# Patient Record
Sex: FEMALE | Race: WHITE | NOT HISPANIC OR LATINO | Employment: FULL TIME | URBAN - METROPOLITAN AREA
[De-identification: names, ages, dates, MRNs, and addresses within clinical notes are randomized per-mention and may not be internally consistent; named-entity substitution may affect disease eponyms.]

---

## 2018-04-10 ENCOUNTER — OFFICE VISIT (OUTPATIENT)
Dept: OBGYN CLINIC | Facility: CLINIC | Age: 28
End: 2018-04-10
Payer: OTHER GOVERNMENT

## 2018-04-10 VITALS
WEIGHT: 138.6 LBS | BODY MASS INDEX: 26.17 KG/M2 | SYSTOLIC BLOOD PRESSURE: 100 MMHG | HEIGHT: 61 IN | DIASTOLIC BLOOD PRESSURE: 71 MMHG

## 2018-04-10 DIAGNOSIS — Z01.419 ENCOUNTER FOR ANNUAL ROUTINE GYNECOLOGICAL EXAMINATION: Primary | ICD-10-CM

## 2018-04-10 DIAGNOSIS — Z30.011 ORAL CONTRACEPTION INITIAL PRESCRIPTION: ICD-10-CM

## 2018-04-10 PROCEDURE — 99385 PREV VISIT NEW AGE 18-39: CPT | Performed by: OBSTETRICS & GYNECOLOGY

## 2018-04-10 PROCEDURE — G0145 SCR C/V CYTO,THINLAYER,RESCR: HCPCS | Performed by: OBSTETRICS & GYNECOLOGY

## 2018-04-10 NOTE — PROGRESS NOTES
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  Her  is in the [facility] and will be moving up here in the next several months  She recently ran out of her birth control pills and since then her periods have been on the more irregular side and heavy  She also has a history of ovarian cysts and has been having some right-sided pelvic discomfort  She is interested in restarting the birth control pill today  She has had no issues with compliance previously  She denies pain with intercourse  She denies vaginal discharge  She has no issues with urination  She does have some issues with her bowels after her previous surgeries  She denies significant medical history  They plan on trying to conceive in approximately 3 years  The following portions of the patient's history were reviewed and updated as appropriate: allergies, current medications, past family history, past medical history, past social history, past surgical history and problem list     Review of Systems   Respiratory: Negative for chest tightness and shortness of breath  Gastrointestinal: Negative for abdominal pain  Genitourinary: Positive for menstrual problem and pelvic pain  Negative for dyspareunia, vaginal bleeding, vaginal discharge and vaginal pain  Objective:      /71   Ht 5' 1 2" (1 554 m)   Wt 62 9 kg (138 lb 9 6 oz)   LMP 04/10/2018   BMI 26 02 kg/m²          Physical Exam   Constitutional: She is oriented to person, place, and time     Pulmonary/Chest: Right breast exhibits no inverted nipple, no mass, no nipple discharge, no skin change and no tenderness  Left breast exhibits no inverted nipple, no mass, no nipple discharge, no skin change and no tenderness  Genitourinary: Vagina normal and uterus normal  There is no tenderness or injury on the right labia  There is no tenderness or injury on the left labia  Uterus is not enlarged and not tender  Cervix exhibits no motion tenderness  Right adnexum displays no mass and no tenderness  Left adnexum displays no mass and no tenderness  No vaginal discharge found  Musculoskeletal: Normal range of motion  Neurological: She is alert and oriented to person, place, and time  Skin: Skin is warm and dry  No erythema  Psychiatric: She has a normal mood and affect

## 2018-04-12 LAB
LAB AP GYN PRIMARY INTERPRETATION: NORMAL
Lab: NORMAL

## 2018-04-25 ENCOUNTER — TELEPHONE (OUTPATIENT)
Dept: OBGYN CLINIC | Facility: CLINIC | Age: 28
End: 2018-04-25

## 2018-04-25 DIAGNOSIS — Z30.41 ENCOUNTER FOR SURVEILLANCE OF CONTRACEPTIVE PILLS: Primary | ICD-10-CM

## 2018-04-25 RX ORDER — NORETHINDRONE ACETATE AND ETHINYL ESTRADIOL 1; .02 MG/1; MG/1
1 TABLET ORAL DAILY
Qty: 21 TABLET | Refills: 7 | Status: SHIPPED | OUTPATIENT
Start: 2018-04-25 | End: 2018-12-27 | Stop reason: SDUPTHER

## 2018-04-25 NOTE — TELEPHONE ENCOUNTER
Spoke with pt - started taking Lo/Ovral - is having a bad reaction to it - gaining weight and having an acne breakout  Patient was previously on Microgestin Fe 1/20 - wants to be put on that  Ok to fill? Please advise  Thanks!

## 2018-04-25 NOTE — TELEPHONE ENCOUNTER
Pt called office today, left voicemail message  Pt saw Dr Honorio Boone on 4/10/18 (new patient)  Pt states she was prescribed  LO/OVRAL 0 3mg - 30 mcg birth control pill at last visit  Pt states she is having a poor reaction to said birth control pill, wants prescription changed  Pt can be reached @ [phone]

## 2018-12-27 DIAGNOSIS — Z30.41 ENCOUNTER FOR SURVEILLANCE OF CONTRACEPTIVE PILLS: Primary | ICD-10-CM

## 2018-12-27 RX ORDER — NORETHINDRONE ACETATE AND ETHINYL ESTRADIOL 1; .02 MG/1; MG/1
1 TABLET ORAL DAILY
Qty: 21 TABLET | Refills: 4 | Status: SHIPPED | OUTPATIENT
Start: 2018-12-27 | End: 2019-04-16

## 2019-04-16 ENCOUNTER — ANNUAL EXAM (OUTPATIENT)
Dept: OBGYN CLINIC | Facility: CLINIC | Age: 29
End: 2019-04-16
Payer: COMMERCIAL

## 2019-04-16 VITALS — SYSTOLIC BLOOD PRESSURE: 112 MMHG | WEIGHT: 134.4 LBS | BODY MASS INDEX: 25.23 KG/M2 | DIASTOLIC BLOOD PRESSURE: 64 MMHG

## 2019-04-16 DIAGNOSIS — Z30.41 ENCOUNTER FOR BIRTH CONTROL PILLS MAINTENANCE: ICD-10-CM

## 2019-04-16 DIAGNOSIS — Z01.419 ENCOUNTER FOR GYNECOLOGICAL EXAMINATION: Primary | ICD-10-CM

## 2019-04-16 PROCEDURE — S0610 ANNUAL GYNECOLOGICAL EXAMINA: HCPCS | Performed by: NURSE PRACTITIONER

## 2019-04-16 RX ORDER — NORETHINDRONE ACETATE AND ETHINYL ESTRADIOL 1; .02 MG/1; MG/1
1 TABLET ORAL DAILY
Qty: 90 TABLET | Refills: 3 | Status: SHIPPED | OUTPATIENT
Start: 2019-04-16 | End: 2020-05-15 | Stop reason: SDUPTHER

## 2019-04-16 RX ORDER — MAGNESIUM 30 MG
30 TABLET ORAL 2 TIMES DAILY
COMMUNITY
End: 2020-08-03 | Stop reason: ALTCHOICE

## 2019-04-19 PROBLEM — Z01.419 ENCOUNTER FOR GYNECOLOGICAL EXAMINATION: Status: ACTIVE | Noted: 2019-04-19

## 2020-04-30 DIAGNOSIS — Z30.41 ENCOUNTER FOR BIRTH CONTROL PILLS MAINTENANCE: ICD-10-CM

## 2020-05-15 ENCOUNTER — TELEPHONE (OUTPATIENT)
Dept: OBGYN CLINIC | Facility: CLINIC | Age: 30
End: 2020-05-15

## 2020-05-15 DIAGNOSIS — Z30.41 ENCOUNTER FOR BIRTH CONTROL PILLS MAINTENANCE: ICD-10-CM

## 2020-05-15 RX ORDER — NORETHINDRONE ACETATE AND ETHINYL ESTRADIOL 1; 20 MG/1; UG/1
TABLET ORAL
Qty: 84 TABLET | Refills: 3 | OUTPATIENT
Start: 2020-05-15

## 2020-05-18 RX ORDER — NORETHINDRONE ACETATE AND ETHINYL ESTRADIOL 1; .02 MG/1; MG/1
1 TABLET ORAL DAILY
Qty: 90 TABLET | Refills: 0 | Status: SHIPPED | OUTPATIENT
Start: 2020-05-18 | End: 2020-08-03 | Stop reason: SDUPTHER

## 2020-08-03 ENCOUNTER — ANNUAL EXAM (OUTPATIENT)
Dept: OBGYN CLINIC | Facility: CLINIC | Age: 30
End: 2020-08-03
Payer: COMMERCIAL

## 2020-08-03 VITALS
SYSTOLIC BLOOD PRESSURE: 108 MMHG | BODY MASS INDEX: 24.8 KG/M2 | HEIGHT: 63 IN | DIASTOLIC BLOOD PRESSURE: 72 MMHG | WEIGHT: 140 LBS

## 2020-08-03 DIAGNOSIS — Z12.4 CERVICAL CANCER SCREENING: ICD-10-CM

## 2020-08-03 DIAGNOSIS — Z01.419 ENCOUNTER FOR GYNECOLOGICAL EXAMINATION (GENERAL) (ROUTINE) WITHOUT ABNORMAL FINDINGS: Primary | ICD-10-CM

## 2020-08-03 DIAGNOSIS — Z30.41 ENCOUNTER FOR BIRTH CONTROL PILLS MAINTENANCE: ICD-10-CM

## 2020-08-03 PROCEDURE — S0612 ANNUAL GYNECOLOGICAL EXAMINA: HCPCS | Performed by: OBSTETRICS & GYNECOLOGY

## 2020-08-03 PROCEDURE — G0145 SCR C/V CYTO,THINLAYER,RESCR: HCPCS | Performed by: OBSTETRICS & GYNECOLOGY

## 2020-08-03 RX ORDER — NORETHINDRONE ACETATE AND ETHINYL ESTRADIOL 1; .02 MG/1; MG/1
1 TABLET ORAL DAILY
Qty: 90 TABLET | Refills: 0 | Status: SHIPPED | OUTPATIENT
Start: 2020-08-03 | End: 2021-08-24 | Stop reason: ALTCHOICE

## 2020-08-03 NOTE — PROGRESS NOTES
Bertha Mendoza  1990    CC:  Yearly exam    S:  34 y o  female here for yearly exam  Her cycles are regular, not heavy or crampy  Sexual activity: She is sexually active without pain, bleeding or dryness  Contraception:  She uses OCP for contraception  However is considering stopping her OCP in next few months to conceive  STD testing:  She does not request STD testing today   and monogamous  Gardasil:  She has not had the Gardasil series  We reviewed ASCCP guidelines for Pap testing       Last Pap 4/10/18 - Normal Cytology    Family hx of breast cancer:  no  Family hx of ovarian cancer:  no  Family hx of colon cancer:  no      Current Outpatient Medications:     norethindrone-ethinyl estradiol (MICROGESTIN 1/20) 1-20 MG-MCG per tablet, Take 1 tablet by mouth daily, Disp: 90 tablet, Rfl: 0  Social History     Socioeconomic History    Marital status: Single     Spouse name: Not on file    Number of children: Not on file    Years of education: Not on file    Highest education level: Not on file   Occupational History    Not on file   Social Needs    Financial resource strain: Not on file    Food insecurity     Worry: Not on file     Inability: Not on file    Transportation needs     Medical: Not on file     Non-medical: Not on file   Tobacco Use    Smoking status: Never Smoker    Smokeless tobacco: Never Used   Substance and Sexual Activity    Alcohol use: Yes     Comment: Social, Drinks Hard Liquor    Drug use: No    Sexual activity: Yes     Partners: Male     Birth control/protection: Pill   Lifestyle    Physical activity     Days per week: Not on file     Minutes per session: Not on file    Stress: Not on file   Relationships    Social connections     Talks on phone: Not on file     Gets together: Not on file     Attends Zoroastrian service: Not on file     Active member of club or organization: Not on file     Attends meetings of clubs or organizations: Not on file     Relationship status: Not on file    Intimate partner violence     Fear of current or ex partner: Not on file     Emotionally abused: Not on file     Physically abused: Not on file     Forced sexual activity: Not on file   Other Topics Concern    Not on file   Social History Narrative    Denied Coffee Consumption    Lack of Exercise     Family History   Problem Relation Age of Onset    Psoriasis Father     Stroke Maternal Grandmother     Diabetes type II Paternal Grandfather      Past Medical History:   Diagnosis Date    GERD (gastroesophageal reflux disease)     Ovarian cyst     Renal calculi        Review of Systems   Respiratory: Negative  Cardiovascular: Negative  Gastrointestinal: Negative for constipation and diarrhea  Genitourinary: Negative for difficulty urinating, pelvic pain, vaginal bleeding, vaginal discharge, itching or odor  O:  Blood pressure 108/72, height 5' 2 75", weight 63 5 kg (140 lb), last menstrual period 07/10/2020  Patient appears well and is not in distress  Neck is supple without masses  Breasts are symmetrical without mass, tenderness, nipple discharge, skin changes or adenopathy  Abdomen is soft and nontender without masses  External genitals are normal without lesions or rashes  Urethra and urethral meatus are normal  Bladder is normal to palpation  Vagina is normal without discharge or bleeding  Cervix is normal without discharge or lesion  Uterus is normal, mobile, nontender without palpable mass  Adnexa are normal, nontender, without palpable mass  A:  Yearly exam      P:   Pap today  Discussed starting PNV, tracking cycles, when to call with pos pregnancy test or lack of pregnancy  RTO one year for yearly exam or sooner as needed

## 2020-08-07 LAB
LAB AP GYN PRIMARY INTERPRETATION: NORMAL
LAB AP LMP: NORMAL
Lab: NORMAL

## 2021-08-10 ENCOUNTER — ULTRASOUND (OUTPATIENT)
Dept: OBGYN CLINIC | Facility: MEDICAL CENTER | Age: 31
End: 2021-08-10
Payer: COMMERCIAL

## 2021-08-10 VITALS
HEIGHT: 62 IN | DIASTOLIC BLOOD PRESSURE: 84 MMHG | WEIGHT: 143 LBS | BODY MASS INDEX: 26.31 KG/M2 | SYSTOLIC BLOOD PRESSURE: 118 MMHG

## 2021-08-10 DIAGNOSIS — N91.1 SECONDARY AMENORRHEA: Primary | ICD-10-CM

## 2021-08-10 PROCEDURE — 99213 OFFICE O/P EST LOW 20 MIN: CPT | Performed by: STUDENT IN AN ORGANIZED HEALTH CARE EDUCATION/TRAINING PROGRAM

## 2021-08-10 PROCEDURE — 76817 TRANSVAGINAL US OBSTETRIC: CPT | Performed by: STUDENT IN AN ORGANIZED HEALTH CARE EDUCATION/TRAINING PROGRAM

## 2021-08-10 NOTE — PROGRESS NOTES
Assessment/Plan:      31 y o   at  9 6/7 wga with TIKI of 3/23/21 by LMP consistent w/ US today  Heart tones WNL  Pt to follow up for OB intake  She is not taking any teratogenic medications  Nausea/vomiting is minimal  Counseled to continue prenatal vitamin  Subjective:      Patient ID:      31 y o   w/ LMP of 21 making her 7 /7 wga and TIKI of 9/15 presents for viability US  She has minimal nausea, no bleeding/cramping  Review of Systems   Constitutional: Negative  Respiratory: Negative  Genitourinary: Negative  Psychiatric/Behavioral: Negative  Objective:      Physical Exam   Constitutional: She appears well-nourished  Cardiovascular: Normal rate  Pulmonary/Chest: Effort normal           Transvaginal US shows a live fetus with a heart beat of 152  Crown-rump length measures 1 33 cm, consistent with 7 7 wga  A yolk sac is visible within the gestational sac  We will keep the patient's due date of  based on her LMP

## 2021-08-24 ENCOUNTER — TELEMEDICINE (OUTPATIENT)
Dept: OBGYN CLINIC | Facility: CLINIC | Age: 31
End: 2021-08-24

## 2021-08-24 ENCOUNTER — TELEPHONE (OUTPATIENT)
Dept: PERINATAL CARE | Facility: CLINIC | Age: 31
End: 2021-08-24

## 2021-08-24 VITALS — HEIGHT: 62 IN | BODY MASS INDEX: 26.31 KG/M2 | WEIGHT: 143 LBS

## 2021-08-24 DIAGNOSIS — Z34.01 ENCOUNTER FOR SUPERVISION OF NORMAL FIRST PREGNANCY IN FIRST TRIMESTER: Primary | ICD-10-CM

## 2021-08-24 PROCEDURE — OBC: Performed by: STUDENT IN AN ORGANIZED HEALTH CARE EDUCATION/TRAINING PROGRAM

## 2021-08-24 NOTE — PROGRESS NOTES
OB INTAKE INTERVIEW  Patient is 31 y o  who presents for OB intake at 9wks  She is accompanied by: Phone Fallon Steinberg  The father of her baby Leesa Hall is involved in the pregnancy and is 29years old    Last Menstrual Period: 21   Ultrasound: Measured 7 weeks 6 days on 8/10/21  Estimated Date of Delivery: 2022 confirmed by 7 week US    Signs/Symptoms of Pregnancy  Current pregnancy symptoms: nausea without emesis, fatigue   Constipation YES- advised Colace   Headaches no  Cramping/spotting YES, cramping but mild  PICA cravings no     Diabetes-  Body mass index is 26 16 kg/m²  If patient has 1 or more, please order early 1 hour GTT  History of GDM no  BMI >35 no  History of PCOS or current metformin use no  History of LGA/macrosomic infant (4000g/9lbs) no    If patient has 2 or more, please order early 1 hour GTT  BMI>30 no  AMA no  First degree relative with type 2 diabetes YES - paternal grandfather  History of chronic HTN, hyperlipidemia, elevated A1C no  High risk race (, , ,  or ) no    Hypertension- if you answer yes, please order preeclampsia labs (cbc, comprehensive metabolic panel, urine protein creatinine ratio, uric acid)  History of of chronic HTN no  History of gestational HTN no  History of preeclampsia, eclampsia, or HELLP syndrome no  History of diabetes no  History of lupus, autoimmune disease, kidney disease no    Thyroid- if yes order TSH with reflex T4  History of thyroid disease no    Bleeding Disorder or Hx of DVT-patient or first degree relative with history of  Order the following if not done previously     (Factor V, antithrombin III, prothrombin gene mutation, protein C and S Ag, lupus anticoagulant, anticardiolipin, beta-2 glycoprotein)   no    OB/GYN-  History of abnormal pap smear no  History of HPV no  History of Herpes/HSV no  History of other STI (gonorrhea, chlamydia, trich) no  History of prior  no  History of prior  no  History of  delivery prior to 36 weeks 6 days no  History of blood transfusion no  Ok for blood transfusion YES    Substance screening- if yes outside of tobacco for her or anyone in her home-order urine drug screen  History of tobacco use no  Currently using tobacco no  Currently using alcohol no  Presently using drugs no  Past drug use  no  IV drug use-If yes add Hep C antibody to labs no  Partner drug use no  Parent/Family drug use no    MRSA Screening-   Does the pt have a hx of MRSA? no  If yes- please follow MRSA protocol and obtain a nasal swab for MRSA culture    Immunizations:  Influenza vaccine given this season YES   Discussed Tdap vaccine YES   Discussed COVID Vaccine YES - undecided    Genetic/MFM-  Do you or your partner have a history of any of the following in yourselves or first degree relatives? Cystic fibrosis no  Spinal muscular atrophy no  Hemoglobinopathy/Sickle Cell/Thalassemia no  Fragile X Intellectual Disability no    If yes, discuss carrier screening and recommend consultation with [facility]/genetic counseling  If no, discuss option for carrier screening and/or genetic testing with Nuchal Ultrasound  Patient interested YES  Appointment at [facility] made NO - but contact information given to make appointment    Interview education  [facility] Pregnancy Essentials Book reviewed and discussed YES    Nurse/Family Partnership- patient may qualify NO; referral placed NO    Prenatal lab work scripts YES  Extra labs ordered:  none    The patient has a history now or in prior pregnancy notable for:  Hx of ovarian cysts, FOB genetic hx cystic fibrosis      Details that I feel the provider should be aware of: Pregnancy planned and welcomed with , Kate Deutsch  Patient works from home and has not received her COVID vaccines yet, she is still undecided and would like to be counseled on this at her PN1   Patient has a hx of ovarian cysts with removal  Patient states she passes out at any bloodwork, so I advised patient to make lab aware of this at her PN labs and to lay down while they are being drawn, to bring a snack/  to her appointment as well  Patients  has a family hx of cystic fibrosis; they would like to get the NIPT testing done with MFM; referal generated  Patient has c/o some cramping and constipation; counseled patient on water intake increases, smaller, more frequent snacks during the day, Colace, and Tylenol if needed  Patient understands  PN1 visit scheduled  The patient was oriented to our practice, reviewed delivering physicians and [facility] for Delivery  All questions were answered  Discussed current restriction in place due to COVID and the use of virtual visits during her pregnancy  If needed We also discussed use of masking and her vaccine status  This intake took place via telephone conversation for 75 minutes  OB packet to be given at her PN-1 visit      Interviewed by: Sara Carr LPN

## 2021-08-24 NOTE — PATIENT INSTRUCTIONS
Pregnancy at 11 to 14 Weeks   AMBULATORY CARE:   Changes happening to your body: You are now at the end of your first trimester and entering your second trimester  Morning sickness usually goes away by this time  You may have other symptoms such as fatigue, frequent urination, and headaches  You may have gained 2 to 4 pounds by now  Seek care immediately if:   · You have pain or cramping in your abdomen or low back  · You have heavy vaginal bleeding or clotting  · You pass material that looks like tissue or large clots  Collect the material and bring it with you  Call your doctor or obstetrician if:   · You cannot keep food or drinks down, and you are losing weight  · You have light vaginal bleeding  · You have chills or a fever  · You have vaginal itching, burning, or pain  · You have yellow, green, white, or foul-smelling vaginal discharge  · You have pain or burning when you urinate, less urine than usual, or pink or bloody urine  · You have questions or concerns about your condition or care  How to care for yourself at this stage of your pregnancy:   · Get plenty of rest   You may feel more tired than normal  You may need to take naps or go to bed earlier  · Manage nausea and vomiting  Avoid fatty and spicy foods  Eat small meals throughout the day instead of large meals  Alma Rosa may help to decrease nausea  Ask your healthcare provider about other ways of decreasing nausea and vomiting  · Eat a variety of healthy foods  Healthy foods include fruits, vegetables, whole-grain breads, low-fat dairy foods, beans, lean meats, and fish  Drink liquids as directed  Ask how much liquid to drink each day and which liquids are best for you  Limit caffeine to less than 200 milligrams each day  Limit your intake of fish to 2 servings each week  Choose fish low in mercury such as canned light tuna, shrimp, salmon, cod, or tilapia   Do not  eat fish high in mercury such as swordfish, tilefish, rob mackerel, and shark  · Take prenatal vitamins as directed  Your need for certain vitamins and minerals, such as folic acid, increases during pregnancy  Prenatal vitamins provide some of the extra vitamins and minerals you need  Prenatal vitamins may also help to decrease the risk of certain birth defects  · Do not smoke  Smoking increases your risk of a miscarriage and other health problems during your pregnancy  Smoking can cause your baby to be born too early or weigh less at birth  Ask your healthcare provider for information if you need help quitting  · Do not drink alcohol  Alcohol passes from your body to your baby through the placenta  It can affect your baby's brain development and cause fetal alcohol syndrome (FAS)  FAS is a group of conditions that causes mental, behavior, and growth problems  · Talk to your healthcare provider before you take any medicines  Many medicines may harm your baby if you take them when you are pregnant  Do not take any medicines, vitamins, herbs, or supplements without first talking to your healthcare provider  Never use illegal or street drugs (such as marijuana or cocaine) while you are pregnant  Safety tips during pregnancy:   · Avoid hot tubs and saunas  Do not use a hot tub or sauna while you are pregnant, especially during your first trimester  Hot tubs and saunas may raise your baby's temperature and increase the risk of birth defects  · Avoid toxoplasmosis  This is an infection caused by eating raw meat or being around infected cat feces  It can cause birth defects, miscarriages, and other problems  Wash your hands after you touch raw meat  Make sure any meat is well-cooked before you eat it  Avoid raw eggs and unpasteurized milk  Use gloves or ask someone else to clean your cat's litter box while you are pregnant  Changes happening with your baby: Your baby has fully formed fingernails and toenails   Your baby's heartbeat can now be heard  Ask your healthcare provider if you can listen to your baby's heartbeat  By week 14, your baby is over 4 inches long from the top of the head to the rump (baby's bottom)  Your baby weighs over 3 ounces  Prenatal care:  Prenatal care is a series of visits with your healthcare provider throughout your pregnancy  During the first 28 weeks of your pregnancy, you will see your healthcare provider 1 time each month  Prenatal care can help prevent problems during pregnancy and childbirth  Your healthcare provider will check your blood pressure and weight  Your baby's heart rate will also be checked  You may also need the following at some visits:  · A pelvic exam  allows your healthcare provider to see your cervix (the bottom part of your uterus)  Your healthcare provider will use a speculum to open your vagina  He or she will check the size and shape of your uterus  · Blood tests  may be done to check for any of the following:     ? Gestational diabetes or anemia (low iron level)    ? Blood type or Rh factor, or certain birth defects    ? Immunity to certain diseases, such as chickenpox or rubella    ? An infection, such as a sexually transmitted infection, HIV, or hepatitis B    · Hepatitis B  may need to be prevented or treated  Hepatitis B is inflammation of the liver caused by the hepatitis B virus (HBV)  HBV can spread from a mother to her baby during delivery  You will be checked for HBV as early as possible in the first trimester of each pregnancy  You need the test even if you received the hepatitis B vaccine or were tested before  You may need to have an HBV infection treated before you give birth  · Urine tests  may also be done to check for sugar and protein  These can be signs of gestational diabetes or preeclampsia  Urine tests may also be done to check for signs of infection  · A fetal ultrasound  shows pictures of your baby inside your uterus   The pictures are used to check your baby's development, movement, and position  · Genetic disorder screening tests  may be offered to you  These tests check your baby's risk for genetic disorders such as Down syndrome  A screening test includes a blood test and ultrasound  Follow up with your doctor or obstetrician as directed:  Go to all prenatal visits  Write down your questions so you remember to ask them during your visits  © Copyright [employer] 2021 Information is for End User's use only and may not be sold, redistributed or otherwise used for commercial purposes  All illustrations and images included in CareNotes® are the copyrighted property of [employer] Inc  or [facility] [employer]   The above information is an  only  It is not intended as medical advice for individual conditions or treatments  Talk to your doctor, nurse or pharmacist before following any medical regimen to see if it is safe and effective for you  Pregnancy   AMBULATORY CARE:   What you need to know about pregnancy:  A normal pregnancy lasts about 40 weeks  The first trimester lasts from your last period through the 12th week of pregnancy  The second trimester lasts from the 13th week through the 23rd week  The third trimester lasts from the 24th week until your baby is born  If you know the date of your last period, your healthcare provider can estimate your due date  You may give birth to your baby any time from 37 weeks to 2 weeks after your due date  Seek care immediately if:   · You develop a severe headache that does not go away  · You have new or increased vision changes, such as blurred or spotted vision  · You have new or increased swelling in your face or hands  · You have pain or cramping in your abdomen or low back  · You have vaginal bleeding  Call your doctor or obstetrician if:   · You have abdominal cramps, pressure, or tightening  · You have a change in vaginal discharge      · You cannot keep food or drinks down, and you are losing weight  · You have chills or a fever  · You have vaginal itching, burning, or pain  · You have yellow, green, white, or foul-smelling vaginal discharge  · You have pain or burning when you urinate, less urine than usual, or pink or bloody urine  · You have questions or concerns about your condition or care  Prenatal care:  Prenatal care is a series of visits with your healthcare provider throughout your pregnancy  Prenatal care can help prevent problems during pregnancy and childbirth  At each prenatal visit, your provider will weigh you and check your blood pressure  He or she will also check your baby's heartbeat and growth  You may also need the following at some visits:  · A pelvic exam  allows your healthcare provider to see your cervix (the bottom part of your uterus)  Your healthcare provider will use a speculum to open your vagina  He or she will check the size and shape of your uterus  At your first prenatal visit, you may also have a Pap smear  This is a test to check your cervix for abnormal cells  · Blood tests  may be done to check for any of the following:     ? Gestational diabetes or anemia (low iron level)    ? Blood type or Rh factor, or certain birth defects    ? Immunity to certain diseases, such as chickenpox or rubella    ? An infection, such as a sexually transmitted infection, HIV, or hepatitis B    · Hepatitis B  may need to be prevented or treated  Hepatitis B is inflammation of the liver caused by the hepatitis B virus (HBV)  HBV can spread from a mother to her baby during delivery  You will be checked for HBV as early as possible in the first trimester of each pregnancy  You need the test even if you received the hepatitis B vaccine or were tested before  You may need to have an HBV infection treated before you give birth  · Urine tests  may also be done to check for sugar and protein  These can be signs of gestational diabetes or preeclampsia   Urine tests may also be done to check for signs of infection  · A fetal ultrasound  shows pictures of your baby inside your uterus  The pictures are used to check your baby's development, movement, and position  · Genetic disorder screening tests  may be offered to you  These tests check your baby's risk for genetic disorders such as Down syndrome  A screening test may include blood tests and an ultrasound  Blood tests may be used to check your DNA or your partner's DNA  Genetic tests are not always accurate or complete  Your baby may be born with a genetic disorder that did not show up in the tests  Talk to your healthcare provider about any concerns you have with genetic testing  Body changes that may occur during your pregnancy:   · Breast changes  you will experience include tenderness and tingling during the early part of your pregnancy  Your breasts will become larger  You may need to use a support bra  You may see a thin, yellow fluid, called colostrum, leak from your nipples during the second trimester  Colostrum is a liquid that changes to milk about 3 days after you give birth  · Skin changes and stretch marks  may occur during your pregnancy  You may have red marks, called stretch marks, on your skin  Stretch marks will usually fade after pregnancy  Use lotion if your skin is dry and itchy  The skin on your face, around your nipples, and below your belly button may darken  Most of the time, your skin will return to its normal color after your baby is born  · Morning sickness  is nausea and vomiting that can happen at any time of day  Avoid fatty and spicy foods  Eat small meals throughout the day instead of large meals  Alma Rosa may help to decrease nausea  Ask your healthcare provider about other ways of decreasing nausea and vomiting  · Heartburn  may be caused by changes in your hormones during pregnancy   Your growing uterus may also push your stomach upward and force stomach acid to back up into your esophagus  Eat 4 or 5 small meals each day instead of large meals  Avoid spicy foods  Avoid eating right before bedtime  · Constipation  may develop during your pregnancy  To treat constipation, eat foods high in fiber such as fiber cereals, beans, fruits, vegetables, whole-grain breads, and prune juice  Get regular exercise and drink plenty of water  Your healthcare provider may also suggest a fiber supplement to soften your bowel movements  Talk to your healthcare provider before you use any medicines to decrease constipation  · Hemorrhoids  are enlarged veins in the rectal area  They may cause pain, itching, and bright red bleeding from your rectum  To decrease your risk for hemorrhoids, prevent constipation and do not strain to have a bowel movement  If you have hemorrhoids, soak in a tub of warm water to ease discomfort  Ask your healthcare provider how you can treat hemorrhoids  · Leg cramps and swelling  may be caused by low calcium levels or the added weight of pregnancy  Raise your legs above the level of your heart to decrease swelling  During a leg cramp, stretch or massage the muscle that has the cramp  Heat may help decrease pain and muscle spasms  Apply heat on your muscle for 20 to 30 minutes every 2 hours for as many days as directed  · Back pain  may occur as your baby grows  Do not stand for long periods of time or lift heavy items  Use good posture while you stand, squat, or bend  Wear low-heeled shoes with good support  Rest may also help to relieve back pain  Ask your healthcare provider about exercises you can do to strengthen your back muscles  Stay healthy during your pregnancy:   · Eat a variety of healthy foods  Healthy foods include fruits, vegetables, whole-grain breads, low-fat dairy foods, beans, lean meats, and fish  Drink liquids as directed  Ask how much liquid to drink each day and which liquids are best for you   Limit caffeine to less than 200 milligrams each day  Limit your intake of fish to 2 servings each week  Choose fish low in mercury such as canned light tuna, shrimp, crab, salmon, cod, or tilapia  Do not  eat fish high in mercury such as swordfish, tilefish, orb mackerel, and shark  · Take prenatal vitamins as directed  Your need for certain vitamins and minerals, such as folic acid, increases during pregnancy  Prenatal vitamins provide some of the extra vitamins and minerals you need  Prenatal vitamins may also help to decrease the risk for certain birth defects  · Ask how much weight you should gain during your pregnancy  Too much or too little weight gain can be unhealthy for you and your baby  · Talk to your healthcare provider about exercise  Moderate exercise can help you stay fit  Your healthcare provider will help you plan an exercise program that is safe for you during pregnancy  · Do not smoke  Smoking increases your risk for a miscarriage and heart and blood vessel problems  Smoking can cause your baby to be born too early or weigh less at birth  Quit smoking as soon as you think you might be pregnant  Ask your healthcare provider for information if you need help quitting  · Do not drink alcohol  Alcohol passes from your body to your baby through the placenta  It can affect your baby's brain development and cause fetal alcohol syndrome (FAS)  FAS is a group of conditions that causes mental, behavior, and growth problems  · Talk to your healthcare provider before you take any medicines  Many medicines may harm your baby if you take them when you are pregnant  Do not take any medicines, vitamins, herbs, or supplements without first talking to your healthcare provider  Never use illegal or street drugs (such as marijuana or cocaine) while you are pregnant  Safety tips:   · Avoid hot tubs and saunas  Do not use a hot tub or sauna while you are pregnant, especially during your first trimester  Hot tubs and saunas may raise your baby's temperature and increase the risk for birth defects  · Avoid toxoplasmosis  This is an infection caused by eating raw meat or being around infected cat feces  It can cause birth defects, miscarriages, and other problems  Wash your hands after you touch raw meat  Make sure any meat is well-cooked before you eat it  Avoid raw eggs and unpasteurized milk  Use gloves or ask someone else to clean your cat's litter box while you are pregnant  · Ask your healthcare provider about travel  The most comfortable time to travel is during the second trimester  Ask your healthcare provider if you can travel after 36 weeks  You may not be able to travel in an airplane after 36 weeks  He or she may also recommend that you avoid long road trips  Follow up with your doctor or obstetrician as directed:  Go to all of your prenatal visits during your pregnancy  Write down your questions so you remember to ask them during your visits  © Copyright [employer] 2021 Information is for End User's use only and may not be sold, redistributed or otherwise used for commercial purposes  All illustrations and images included in CareNotes® are the copyrighted property of [employer]  or [facility] [place] [employer]ruett   The above information is an  only  It is not intended as medical advice for individual conditions or treatments  Talk to your doctor, nurse or pharmacist before following any medical regimen to see if it is safe and effective for you

## 2021-08-24 NOTE — TELEPHONE ENCOUNTER
Took a call to schedule this patient for 2 u/s appointments  Her mychart was not yet activated to provide electronic communication regarding these appointments  She requested waiting to schedule her Lvl 2 Detailed u/s until she has an idea of her schedule during that time

## 2021-08-27 NOTE — PROGRESS NOTES
If pt and  are interested - pt's  should be tested for cystic fibrosis given his family history  If he is positive then Reza Griffin would be tested as well  He can call PCP for testing or we can place order if he's in the system   thanks

## 2021-09-07 PROBLEM — Z34.01 ENCOUNTER FOR SUPERVISION OF NORMAL FIRST PREGNANCY IN FIRST TRIMESTER: Status: ACTIVE | Noted: 2021-09-07

## 2021-09-07 NOTE — ASSESSMENT & PLAN NOTE
Cadence Holley is a 31y o  year old  at 15 weeks who presents for initial prenatal visit with her  Viri Harrison, Planned pregnancy    PMHx noncontributory  The patient denies complaints  Denies pelvic pain, bleeding, LOF  Reports occasional nasuea     Exam WNL  Prenatal labs need to be drawn, advised to have done ASAP,   Pap up to date   gc/chl sent today  Reviewed options for low risk aneuploidy screening  Discussed risks/benefits/limitations  The patient reports she is scheduled for MFM appt next week would like CFDNA   Level 2 scheduled     Patient Education: Patient was counseled regarding diet, exercise, weight gain, foods to avoid, vaccines in pregnancy, aneuploidy screening, travel precautions to include seat belt use and VTE risk reduction  She has been provided our pregnancy packet which includes pregnancy warning signs,how and when to contact providers, visit intervals, Maternal fetal medicine information, medication recommendations that are safe during pregnancy, dietary suggestions, activity recommendations, breastfeeding information as well as websites for additional information, and delivery location

## 2021-09-07 NOTE — PATIENT INSTRUCTIONS
Valuable Online Resource:    [facility] pregnancy essential guide    [email or web address]      On the right side of the screen is a 50 page guide providing valuable information about your entire pregnancy  On the left hand side of the site you will see several other links to great information and resources that [facility] offers     If you click on the tab that says "Pregnancy and Birth Packet" this opens another 150 page guide to labor and delivery information as well as breast feeding information,  care, pediatricians, car seat safety and much more     The [facility] and [address] tab has a virtual tour of the new L&D unit, as well as valuable information about classes that are offered, breast feeding support, support groups and much more  Click around and enjoy all we have to offer! Please note that all information in regards to locations and visiting hours have not been updated due to COVID    We only deliver our babies at one location which is at [place] [address], [address] [address]            Maternal Fetal Medicine     Nuchal Translucency ( NT) ultrasound is offered in the first trimester of pregnancy to assess your developing baby and look for any markers that may indicate a risk for certain chromosomal conditions such as Down's syndrome  This ultrasound is offered to all pregnant women along with several options for blood screening  During your ultrasound appointment the Perinatologist will discuss these options and together you can decide which screen is best for you  We encourage you to view the following video prior to your appointment to learn more:  [email or web address]     Please check your individual insurance plan to determine if the screening is covered, requires prior authorization or if you will incur any out of pocket cost    It is also important to know if your insurance company has a preferred in network lab such as [facility] or [facility]      Below is list of CPT codes for blood screening options  CPT codes are procedure codes that tell your insurance company what testing is being done  In order for testing to be performed in a timely and efficient manner it is best if you have this information available before your first visit with our office  Please note, [facility]'s genetic testing division is called [facility]  Sequential Screen - 2 part screen, CPT codes are dependent on the lab used:     [facility] lab    Part 1 code 46001,31481      Part 2 code [id number], [address] 1 code  [address]                Part 2 code 59868        NIPT (cell free DNA testing)  CPT code 46980        NIPT testing through [facility] is called [id number]  Please use the  @ Triggit   > click estimate my cost>  pregnancy>   [id number] plus  OR call # [phone] and speak to a   Be sure to ask about the [facility] program for additional financial assistance  NIPT testing through [facility] is called [facility]   Please use the  @ [email or web address]  If you have any questions please feel free to reach out to our office at [phone]  Genetic Counseling appointments are available for any patient but strongly encouraged for Moms 28 or older or patients with family history of genetic disorders  Pregnancy at 15 to 18 Weeks   AMBULATORY CARE:   What changes are happening to your body:  Now that you are in your second trimester, you have more energy  You may also feel hungrier than usual  You may start to experience other symptoms, such as heartburn or dizziness  You may be gaining about ½ to 1 pound a week, and your pregnancy is beginning to show  You may need to start wearing maternity clothes    Seek care immediately if:   · You have pain or cramping in your abdomen or low back  · You have heavy vaginal bleeding or clotting  · You pass material that looks like tissue or large clots  Collect the material and bring it with you  Contact your healthcare provider if:   · You cannot keep food or drinks down, and you are losing weight  · You have light bleeding  · You have chills or a fever  · You have vaginal itching, burning, or pain  · You have yellow, green, white, or foul-smelling vaginal discharge  · You have pain or burning when you urinate, less urine than usual, or pink or bloody urine  · You have questions or concerns about your condition or care  How to care for yourself at this stage of your pregnancy:   · Manage heartburn  by eating 4 or 5 small meals each day instead of large meals  Avoid spicy foods  Avoid eating right before bedtime  · Manage nausea and vomiting  Avoid fatty and spicy foods  Eat small meals throughout the day instead of large meals  Alma Rosa may help to decrease nausea  Ask your healthcare provider about other ways of decreasing nausea and vomiting  · Eat a variety of healthy foods  Healthy foods include fruits, vegetables, whole-grain breads, low-fat dairy foods, beans, lean meats, and fish  Drink liquids as directed  Ask how much liquid to drink each day and which liquids are best for you  Limit caffeine to less than 200 milligrams each day  Limit your intake of fish to 2 servings each week  Choose fish low in mercury such as canned light tuna, shrimp, salmon, cod, or tilapia  Do not  eat fish high in mercury such as swordfish, tilefish, rob mackerel, and shark  · Take prenatal vitamins as directed  Your need for certain vitamins and minerals, such as folic acid, increases during pregnancy  Prenatal vitamins provide some of the extra vitamins and minerals you need  Prenatal vitamins may also help to decrease the risk of certain birth defects  · Do not smoke    Smoking increases your risk of a miscarriage and other health problems during your pregnancy  Smoking can cause your baby to be born too early or weigh less at birth  Ask your healthcare provider for information if you need help quitting  · Do not drink alcohol  Alcohol passes from your body to your baby through the placenta  It can affect your baby's brain development and cause fetal alcohol syndrome (FAS)  FAS is a group of conditions that causes mental, behavior, and growth problems  · Talk to your healthcare provider before you take any medicines  Many medicines may harm your baby if you take them when you are pregnant  Do not take any medicines, vitamins, herbs, or supplements without first talking to your healthcare provider  Never use illegal or street drugs (such as marijuana or cocaine) while you are pregnant  Safety tips during pregnancy:   · Avoid hot tubs and saunas  Do not use a hot tub or sauna while you are pregnant, especially during your first trimester  Hot tubs and saunas may raise your baby's temperature and increase the risk of birth defects  · Avoid toxoplasmosis  This is an infection caused by eating raw meat or being around infected cat feces  It can cause birth defects, miscarriages, and other problems  Wash your hands after you touch raw meat  Make sure any meat is well-cooked before you eat it  Avoid raw eggs and unpasteurized milk  Use gloves or ask someone else to clean your cat's litter box while you are pregnant  Changes that are happening with your baby:  By 18 weeks, your baby may be about 6 inches long from the top of the head to the rump (baby's bottom)  Your baby may weigh about 11 ounces  You may be able to feel your baby's movement at about 18 weeks or later  The first movements may not be that noticeable  They may feel like a fluttering sensation  Your baby also makes sucking movements and can hear certain sounds     What you need to know about prenatal care:  During the first 28 weeks of your pregnancy, you will see your healthcare provider once a month  Your healthcare provider will check your blood pressure and weight  You may also need any of the following:  · A urine test  may also be done to check for sugar and protein  These can be signs of gestational diabetes or infection  · A blood test  may be done to check for anemia (low iron level)  · Fundal height check  is a measurement of your uterus to check your baby's growth  This number is usually the same as the number of weeks that you have been pregnant  · An ultrasound  may be done to check your baby's development  Your healthcare provider may be able to tell you what your baby's gender is during the ultrasound  · Your baby's heart rate  will be checked  © Copyright [employer] 2021 Information is for End User's use only and may not be sold, redistributed or otherwise used for commercial purposes  All illustrations and images included in CareNotes® are the copyrighted property of A D A M , Inc  or [employer]   The above information is an  only  It is not intended as medical advice for individual conditions or treatments  Talk to your doctor, nurse or pharmacist before following any medical regimen to see if it is safe and effective for you  Pregnancy at 15 to 18 Weeks   AMBULATORY CARE:   What changes are happening to your body:  Now that you are in your second trimester, you have more energy  You may also feel hungrier than usual  You may start to experience other symptoms, such as heartburn or dizziness  You may be gaining about ½ to 1 pound a week, and your pregnancy is beginning to show  You may need to start wearing maternity clothes  Seek care immediately if:   · You have pain or cramping in your abdomen or low back  · You have heavy vaginal bleeding or clotting  · You pass material that looks like tissue or large clots   Collect the material and bring it with you     Contact your healthcare provider if:   · You cannot keep food or drinks down, and you are losing weight  · You have light bleeding  · You have chills or a fever  · You have vaginal itching, burning, or pain  · You have yellow, green, white, or foul-smelling vaginal discharge  · You have pain or burning when you urinate, less urine than usual, or pink or bloody urine  · You have questions or concerns about your condition or care  How to care for yourself at this stage of your pregnancy:   · Manage heartburn  by eating 4 or 5 small meals each day instead of large meals  Avoid spicy foods  Avoid eating right before bedtime  · Manage nausea and vomiting  Avoid fatty and spicy foods  Eat small meals throughout the day instead of large meals  Alma Rosa may help to decrease nausea  Ask your healthcare provider about other ways of decreasing nausea and vomiting  · Eat a variety of healthy foods  Healthy foods include fruits, vegetables, whole-grain breads, low-fat dairy foods, beans, lean meats, and fish  Drink liquids as directed  Ask how much liquid to drink each day and which liquids are best for you  Limit caffeine to less than 200 milligrams each day  Limit your intake of fish to 2 servings each week  Choose fish low in mercury such as canned light tuna, shrimp, salmon, cod, or tilapia  Do not  eat fish high in mercury such as swordfish, tilefish, rob mackerel, and shark  · Take prenatal vitamins as directed  Your need for certain vitamins and minerals, such as folic acid, increases during pregnancy  Prenatal vitamins provide some of the extra vitamins and minerals you need  Prenatal vitamins may also help to decrease the risk of certain birth defects  · Do not smoke  Smoking increases your risk of a miscarriage and other health problems during your pregnancy  Smoking can cause your baby to be born too early or weigh less at birth   Ask your healthcare provider for information if you need help quitting  · Do not drink alcohol  Alcohol passes from your body to your baby through the placenta  It can affect your baby's brain development and cause fetal alcohol syndrome (FAS)  FAS is a group of conditions that causes mental, behavior, and growth problems  · Talk to your healthcare provider before you take any medicines  Many medicines may harm your baby if you take them when you are pregnant  Do not take any medicines, vitamins, herbs, or supplements without first talking to your healthcare provider  Never use illegal or street drugs (such as marijuana or cocaine) while you are pregnant  Safety tips during pregnancy:   · Avoid hot tubs and saunas  Do not use a hot tub or sauna while you are pregnant, especially during your first trimester  Hot tubs and saunas may raise your baby's temperature and increase the risk of birth defects  · Avoid toxoplasmosis  This is an infection caused by eating raw meat or being around infected cat feces  It can cause birth defects, miscarriages, and other problems  Wash your hands after you touch raw meat  Make sure any meat is well-cooked before you eat it  Avoid raw eggs and unpasteurized milk  Use gloves or ask someone else to clean your cat's litter box while you are pregnant  Changes that are happening with your baby:  By 18 weeks, your baby may be about 6 inches long from the top of the head to the rump (baby's bottom)  Your baby may weigh about 11 ounces  You may be able to feel your baby's movement at about 18 weeks or later  The first movements may not be that noticeable  They may feel like a fluttering sensation  Your baby also makes sucking movements and can hear certain sounds  What you need to know about prenatal care:  During the first 28 weeks of your pregnancy, you will see your healthcare provider once a month  Your healthcare provider will check your blood pressure and weight   You may also need any of the following:  · A urine test  may also be done to check for sugar and protein  These can be signs of gestational diabetes or infection  · A blood test  may be done to check for anemia (low iron level)  · Fundal height check  is a measurement of your uterus to check your baby's growth  This number is usually the same as the number of weeks that you have been pregnant  · An ultrasound  may be done to check your baby's development  Your healthcare provider may be able to tell you what your baby's gender is during the ultrasound  · Your baby's heart rate  will be checked  © Copyright [employer] 2021 Information is for End User's use only and may not be sold, redistributed or otherwise used for commercial purposes  All illustrations and images included in CareNotes® are the copyrighted property of A D A M , Inc  or Alana Bliss  The above information is an  only  It is not intended as medical advice for individual conditions or treatments  Talk to your doctor, nurse or pharmacist before following any medical regimen to see if it is safe and effective for you

## 2021-09-09 ENCOUNTER — INITIAL PRENATAL (OUTPATIENT)
Dept: OBGYN CLINIC | Facility: MEDICAL CENTER | Age: 31
End: 2021-09-09

## 2021-09-09 VITALS
DIASTOLIC BLOOD PRESSURE: 68 MMHG | WEIGHT: 139 LBS | BODY MASS INDEX: 25.58 KG/M2 | SYSTOLIC BLOOD PRESSURE: 110 MMHG | HEIGHT: 62 IN

## 2021-09-09 DIAGNOSIS — Z34.01 ENCOUNTER FOR SUPERVISION OF NORMAL FIRST PREGNANCY IN FIRST TRIMESTER: Primary | ICD-10-CM

## 2021-09-09 DIAGNOSIS — Z11.3 SCREENING FOR STDS (SEXUALLY TRANSMITTED DISEASES): ICD-10-CM

## 2021-09-09 LAB
SL AMB  POCT GLUCOSE, UA: NEGATIVE
SL AMB POCT URINE PROTEIN: NEGATIVE

## 2021-09-09 PROCEDURE — 87591 N.GONORRHOEAE DNA AMP PROB: CPT | Performed by: OBSTETRICS & GYNECOLOGY

## 2021-09-09 PROCEDURE — 87491 CHLMYD TRACH DNA AMP PROBE: CPT | Performed by: OBSTETRICS & GYNECOLOGY

## 2021-09-09 PROCEDURE — PNV: Performed by: OBSTETRICS & GYNECOLOGY

## 2021-09-09 NOTE — PROGRESS NOTES
Encounter for supervision of normal first pregnancy in first trimester  PN1  Abimbola Zamora is a 31y o  year old  at 15 weeks who presents for initial prenatal visit with her  Vandana Buckley, Planned pregnancy    PMHx noncontributory  The patient denies complaints  Denies pelvic pain, bleeding, LOF  Exam WNL  Prenatal labs need to be drawn, advised to have done ASAP,   Pap up to date   gc/chl sent today  Reviewed options for low risk aneuploidy screening  Discussed risks/benefits/limitations  The patient reports she is scheduled for M appt  and L2  Patient Education: Patient was counseled regarding diet, exercise, weight gain, foods to avoid, vaccines in pregnancy, aneuploidy screening, travel precautions to include seat belt use and VTE risk reduction  She has been provided our pregnancy packet which includes pregnancy warning signs,how and when to contact providers, visit intervals, Maternal fetal medicine information, medication recommendations that are safe during pregnancy, dietary suggestions, activity recommendations, breastfeeding information as well as websites for additional information, and delivery location

## 2021-09-10 LAB
C TRACH DNA SPEC QL NAA+PROBE: NEGATIVE
N GONORRHOEA DNA SPEC QL NAA+PROBE: NEGATIVE

## 2021-09-14 ENCOUNTER — TELEPHONE (OUTPATIENT)
Dept: PERINATAL CARE | Facility: CLINIC | Age: 31
End: 2021-09-14

## 2021-09-14 NOTE — TELEPHONE ENCOUNTER
Phone call to patient and left VMM to confirm MFM NT US appt :  [facility] sent you an important message via [facility]  Message contains a video link by [employer] that explains genetic testing verus screening and information on how to check your insurance coverage for specialty genetic labs  Our perinatology Doctors encourage you to review this information prior to your C/ Redd Ames appointment, after the ultrasound is complete the Dr  will do a consult with you and discuss lab ordering  After viewing video if you have any questions please call the nurse line @ # [phone]  We look forward to seeing you soon at [facility]

## 2021-09-21 ENCOUNTER — ROUTINE PRENATAL (OUTPATIENT)
Dept: PERINATAL CARE | Facility: CLINIC | Age: 31
End: 2021-09-21
Payer: COMMERCIAL

## 2021-09-21 VITALS
HEIGHT: 62 IN | DIASTOLIC BLOOD PRESSURE: 67 MMHG | HEART RATE: 91 BPM | WEIGHT: 140.4 LBS | BODY MASS INDEX: 25.83 KG/M2 | SYSTOLIC BLOOD PRESSURE: 125 MMHG

## 2021-09-21 DIAGNOSIS — Z34.01 ENCOUNTER FOR SUPERVISION OF NORMAL FIRST PREGNANCY IN FIRST TRIMESTER: ICD-10-CM

## 2021-09-21 DIAGNOSIS — Z3A.13 13 WEEKS GESTATION OF PREGNANCY: ICD-10-CM

## 2021-09-21 DIAGNOSIS — Z36.82 ENCOUNTER FOR ANTENATAL SCREENING FOR NUCHAL TRANSLUCENCY: Primary | ICD-10-CM

## 2021-09-21 PROCEDURE — 99242 OFF/OP CONSLTJ NEW/EST SF 20: CPT | Performed by: OBSTETRICS & GYNECOLOGY

## 2021-09-21 PROCEDURE — 76813 OB US NUCHAL MEAS 1 GEST: CPT | Performed by: OBSTETRICS & GYNECOLOGY

## 2021-09-21 NOTE — LETTER
September 21, 2021     [address]  [address] [place] [address]    Patient: Daniel Patel   YOB: 1990   Date of Visit: 9/21/2021       Dear Dr Triny Huitron: Thank you for referring Mj Zendejas to me for evaluation  Below are my notes for this consultation  If you have questions, please do not hesitate to call me  I look forward to following your patient along with you  Sincerely,        Ashli Caldwell MD        CC: No Recipients  Ashli Caldwell MD  9/21/2021  7:30 AM  Sign when Signing Visit  Please refer to the [facility] ultrasound report in Ob Procedures for additional information regarding today's visit

## 2021-09-21 NOTE — PROGRESS NOTES
Patient chose to use [facility] lab and was given lab slip for Noninvasive Prenatal Screen [facility]  Blood sample is drawn at [facility] and online appointment scheduling and lab locations can be found @ www  TakeCharge     [facility]  card given, patient instructed how to check her out- of -pocket responsibility @ [employer]  Patient made aware if [facility]  unable to give an estimate she will need to contact [facility] office prior to blood draw  Insurance may require prior authorization, if test drawn without prior authorization pateint will be responsible for full cost of test     Patient aware that  is provided by third party and is only an estimate of cost not a guarantee  For definitive cost, patient encouraged to call her insurance provider- insurance phone # located on the back of her ID card  Patient verbalized understanding of all instructions and no questions at this time

## 2021-09-21 NOTE — PROGRESS NOTES
Please refer to the [facility] ultrasound report in Ob Procedures for additional information regarding today's visit

## 2021-10-05 ENCOUNTER — ROUTINE PRENATAL (OUTPATIENT)
Dept: OBGYN CLINIC | Facility: MEDICAL CENTER | Age: 31
End: 2021-10-05

## 2021-10-05 VITALS — DIASTOLIC BLOOD PRESSURE: 58 MMHG | SYSTOLIC BLOOD PRESSURE: 100 MMHG | BODY MASS INDEX: 25.68 KG/M2 | WEIGHT: 140.4 LBS

## 2021-10-05 DIAGNOSIS — Z33.1 PREGNANCY, INCIDENTAL: ICD-10-CM

## 2021-10-05 DIAGNOSIS — Z34.02 ENCOUNTER FOR SUPERVISION OF NORMAL FIRST PREGNANCY IN SECOND TRIMESTER: Primary | ICD-10-CM

## 2021-10-05 DIAGNOSIS — Z36.9 ENCOUNTER FOR ANTENATAL SCREENING: ICD-10-CM

## 2021-10-05 PROBLEM — Z30.41 ENCOUNTER FOR BIRTH CONTROL PILLS MAINTENANCE: Status: RESOLVED | Noted: 2019-04-16 | Resolved: 2021-10-05

## 2021-10-05 LAB
SL AMB  POCT GLUCOSE, UA: ABNORMAL
SL AMB POCT URINE PROTEIN: ABNORMAL

## 2021-10-05 PROCEDURE — PNV: Performed by: NURSE PRACTITIONER

## 2021-10-06 LAB
# FETUSES US: 1
CFDNA.FET/TOTAL PLAS.CFDNA: NORMAL
FET CHR 13 TS PLAS.CFDNA QL: NEGATIVE
FET CHR 18 TS PLAS.CFDNA QL: NEGATIVE
FET CHR 21 TS PLAS.CFDNA QL: NEGATIVE
FET CHR X + Y ANEUP PLAS.CFDNA QL: NORMAL
FET CHROM X + Y ANEUP CFDNA IMP: NORMAL
FET Y CHROM PLAS.CFDNA QL: NOT DETECTED
FET Y CHROM PLAS.CFDNA: NORMAL
GA (DAYS): 3 D
GA (WEEKS): 15 WK
MICRODELETION SYND BLD/T FISH: NORMAL
REF LAB TEST METHOD: NORMAL
SERVICE CMNT-IMP: NORMAL
SERVICE CMNT-IMP: NORMAL
SL AMB ABNORMAL MSS?: NORMAL
SL AMB ABNORMAL US?: NORMAL
SL AMB ADVANCED MATERNAL AGE?: NORMAL
SL AMB MICRODELETION INTERP: NORMAL
SL AMB MICRODELETION: NOT DETECTED
SL AMB PERSONAL/FAM HISTORY?: NORMAL
SL AMB SPECIFICATIONS: NORMAL

## 2021-10-07 ENCOUNTER — TELEPHONE (OUTPATIENT)
Dept: PERINATAL CARE | Facility: CLINIC | Age: 31
End: 2021-10-07

## 2021-10-12 ENCOUNTER — APPOINTMENT (OUTPATIENT)
Dept: LAB | Facility: CLINIC | Age: 31
End: 2021-10-12
Payer: COMMERCIAL

## 2021-10-12 DIAGNOSIS — Z33.1 PREGNANCY, INCIDENTAL: ICD-10-CM

## 2021-10-12 DIAGNOSIS — Z36.9 ENCOUNTER FOR ANTENATAL SCREENING: ICD-10-CM

## 2021-10-12 DIAGNOSIS — Z34.01 ENCOUNTER FOR SUPERVISION OF NORMAL FIRST PREGNANCY IN FIRST TRIMESTER: ICD-10-CM

## 2021-10-12 LAB
ABO GROUP BLD: NORMAL
BACTERIA UR QL AUTO: ABNORMAL /HPF
BASOPHILS # BLD AUTO: 0.04 THOUSANDS/ΜL (ref 0–0.1)
BASOPHILS NFR BLD AUTO: 0 % (ref 0–1)
BILIRUB UR QL STRIP: NEGATIVE
BLD GP AB SCN SERPL QL: NEGATIVE
CLARITY UR: ABNORMAL
COLOR UR: YELLOW
EOSINOPHIL # BLD AUTO: 0.08 THOUSAND/ΜL (ref 0–0.61)
EOSINOPHIL NFR BLD AUTO: 1 % (ref 0–6)
ERYTHROCYTE [DISTWIDTH] IN BLOOD BY AUTOMATED COUNT: 12.4 % (ref 11.6–15.1)
GLUCOSE UR STRIP-MCNC: NEGATIVE MG/DL
HBV SURFACE AG SER QL: NORMAL
HCT VFR BLD AUTO: 38.3 % (ref 34.8–46.1)
HGB BLD-MCNC: 12.8 G/DL (ref 11.5–15.4)
HGB UR QL STRIP.AUTO: NEGATIVE
IMM GRANULOCYTES # BLD AUTO: 0.06 THOUSAND/UL (ref 0–0.2)
IMM GRANULOCYTES NFR BLD AUTO: 1 % (ref 0–2)
KETONES UR STRIP-MCNC: NEGATIVE MG/DL
LEUKOCYTE ESTERASE UR QL STRIP: NEGATIVE
LYMPHOCYTES # BLD AUTO: 2.33 THOUSANDS/ΜL (ref 0.6–4.47)
LYMPHOCYTES NFR BLD AUTO: 24 % (ref 14–44)
MCH RBC QN AUTO: 30.7 PG (ref 26.8–34.3)
MCHC RBC AUTO-ENTMCNC: 33.4 G/DL (ref 31.4–37.4)
MCV RBC AUTO: 92 FL (ref 82–98)
MONOCYTES # BLD AUTO: 0.47 THOUSAND/ΜL (ref 0.17–1.22)
MONOCYTES NFR BLD AUTO: 5 % (ref 4–12)
MUCOUS THREADS UR QL AUTO: ABNORMAL
NEUTROPHILS # BLD AUTO: 6.68 THOUSANDS/ΜL (ref 1.85–7.62)
NEUTS SEG NFR BLD AUTO: 69 % (ref 43–75)
NITRITE UR QL STRIP: NEGATIVE
NON-SQ EPI CELLS URNS QL MICRO: ABNORMAL /HPF
NRBC BLD AUTO-RTO: 0 /100 WBCS
PH UR STRIP.AUTO: 6.5 [PH]
PLATELET # BLD AUTO: 243 THOUSANDS/UL (ref 149–390)
PMV BLD AUTO: 9.7 FL (ref 8.9–12.7)
PROT UR STRIP-MCNC: NEGATIVE MG/DL
RBC # BLD AUTO: 4.17 MILLION/UL (ref 3.81–5.12)
RBC #/AREA URNS AUTO: ABNORMAL /HPF
RH BLD: POSITIVE
RUBV IGG SERPL IA-ACNC: 65.6 IU/ML
SP GR UR STRIP.AUTO: 1.02 (ref 1–1.03)
UROBILINOGEN UR QL STRIP.AUTO: 1 E.U./DL
WBC # BLD AUTO: 9.66 THOUSAND/UL (ref 4.31–10.16)
WBC #/AREA URNS AUTO: ABNORMAL /HPF

## 2021-10-12 PROCEDURE — 81001 URINALYSIS AUTO W/SCOPE: CPT

## 2021-10-12 PROCEDURE — 87086 URINE CULTURE/COLONY COUNT: CPT

## 2021-10-12 PROCEDURE — 82105 ALPHA-FETOPROTEIN SERUM: CPT

## 2021-10-12 PROCEDURE — 36415 COLL VENOUS BLD VENIPUNCTURE: CPT

## 2021-10-12 PROCEDURE — 80081 OBSTETRIC PANEL INC HIV TSTG: CPT

## 2021-10-13 LAB
BACTERIA UR CULT: NORMAL
HIV 1+2 AB+HIV1 P24 AG SERPL QL IA: NORMAL
RPR SER QL: NORMAL

## 2021-10-14 LAB
2ND TRIMESTER 4 SCREEN SERPL-IMP: NORMAL
AFP ADJ MOM SERPL: 1.08
AFP INTERP AMN-IMP: NORMAL
AFP INTERP SERPL-IMP: NORMAL
AFP INTERP SERPL-IMP: NORMAL
AFP SERPL-MCNC: 43 NG/ML
AGE AT DELIVERY: 29.3 YR
GA METHOD: NORMAL
GA: 16.9 WEEKS
IDDM PATIENT QL: NO
MULTIPLE PREGNANCY: NO
NEURAL TUBE DEFECT RISK FETUS: 9540 %

## 2021-11-05 ENCOUNTER — ROUTINE PRENATAL (OUTPATIENT)
Dept: OBGYN CLINIC | Facility: MEDICAL CENTER | Age: 31
End: 2021-11-05

## 2021-11-05 VITALS
DIASTOLIC BLOOD PRESSURE: 68 MMHG | SYSTOLIC BLOOD PRESSURE: 110 MMHG | WEIGHT: 143.2 LBS | BODY MASS INDEX: 26.35 KG/M2 | HEIGHT: 62 IN

## 2021-11-05 DIAGNOSIS — Z34.02 ENCOUNTER FOR SUPERVISION OF NORMAL FIRST PREGNANCY IN SECOND TRIMESTER: Primary | ICD-10-CM

## 2021-11-05 PROBLEM — Z01.419 ENCOUNTER FOR GYNECOLOGICAL EXAMINATION: Status: RESOLVED | Noted: 2019-04-19 | Resolved: 2021-11-05

## 2021-11-05 LAB
SL AMB  POCT GLUCOSE, UA: NEGATIVE
SL AMB POCT URINE PROTEIN: NEGATIVE

## 2021-11-05 PROCEDURE — PNV: Performed by: STUDENT IN AN ORGANIZED HEALTH CARE EDUCATION/TRAINING PROGRAM

## 2021-11-08 ENCOUNTER — ROUTINE PRENATAL (OUTPATIENT)
Dept: PERINATAL CARE | Facility: OTHER | Age: 31
End: 2021-11-08
Payer: COMMERCIAL

## 2021-11-08 ENCOUNTER — TELEPHONE (OUTPATIENT)
Dept: OBGYN CLINIC | Facility: MEDICAL CENTER | Age: 31
End: 2021-11-08

## 2021-11-08 VITALS
WEIGHT: 141.31 LBS | DIASTOLIC BLOOD PRESSURE: 72 MMHG | BODY MASS INDEX: 26.01 KG/M2 | SYSTOLIC BLOOD PRESSURE: 118 MMHG | HEIGHT: 62 IN | HEART RATE: 99 BPM

## 2021-11-08 DIAGNOSIS — Z3A.20 20 WEEKS GESTATION OF PREGNANCY: Primary | ICD-10-CM

## 2021-11-08 DIAGNOSIS — Z36.86 ENCOUNTER FOR ANTENATAL SCREENING FOR CERVICAL LENGTH: ICD-10-CM

## 2021-11-08 DIAGNOSIS — Z83.49 FAMILY HISTORY OF CYSTIC FIBROSIS: Primary | ICD-10-CM

## 2021-11-08 DIAGNOSIS — Z36.89 ENCOUNTER FOR FETAL ANATOMIC SURVEY: ICD-10-CM

## 2021-11-08 PROCEDURE — 76805 OB US >/= 14 WKS SNGL FETUS: CPT | Performed by: OBSTETRICS & GYNECOLOGY

## 2021-11-08 PROCEDURE — 76817 TRANSVAGINAL US OBSTETRIC: CPT | Performed by: OBSTETRICS & GYNECOLOGY

## 2021-11-08 PROCEDURE — 99213 OFFICE O/P EST LOW 20 MIN: CPT | Performed by: OBSTETRICS & GYNECOLOGY

## 2021-12-01 PROBLEM — Z3A.20 20 WEEKS GESTATION OF PREGNANCY: Status: RESOLVED | Noted: 2021-11-08 | Resolved: 2021-12-01

## 2021-12-02 ENCOUNTER — ROUTINE PRENATAL (OUTPATIENT)
Dept: OBGYN CLINIC | Facility: MEDICAL CENTER | Age: 31
End: 2021-12-02

## 2021-12-02 VITALS
SYSTOLIC BLOOD PRESSURE: 118 MMHG | HEIGHT: 62 IN | BODY MASS INDEX: 26.5 KG/M2 | WEIGHT: 144 LBS | DIASTOLIC BLOOD PRESSURE: 70 MMHG

## 2021-12-02 DIAGNOSIS — Z34.02 ENCOUNTER FOR SUPERVISION OF NORMAL FIRST PREGNANCY IN SECOND TRIMESTER: Primary | ICD-10-CM

## 2021-12-02 LAB
SL AMB  POCT GLUCOSE, UA: NEGATIVE
SL AMB POCT URINE PROTEIN: NEGATIVE

## 2021-12-02 PROCEDURE — PNV: Performed by: OBSTETRICS & GYNECOLOGY

## 2021-12-28 ENCOUNTER — APPOINTMENT (OUTPATIENT)
Dept: LAB | Facility: MEDICAL CENTER | Age: 31
End: 2021-12-28
Payer: COMMERCIAL

## 2021-12-28 DIAGNOSIS — Z34.02 ENCOUNTER FOR SUPERVISION OF NORMAL FIRST PREGNANCY IN SECOND TRIMESTER: ICD-10-CM

## 2021-12-28 LAB
BASOPHILS # BLD AUTO: 0.04 THOUSANDS/ΜL (ref 0–0.1)
BASOPHILS NFR BLD AUTO: 0 % (ref 0–1)
EOSINOPHIL # BLD AUTO: 0.07 THOUSAND/ΜL (ref 0–0.61)
EOSINOPHIL NFR BLD AUTO: 1 % (ref 0–6)
ERYTHROCYTE [DISTWIDTH] IN BLOOD BY AUTOMATED COUNT: 12.1 % (ref 11.6–15.1)
GLUCOSE 1H P 50 G GLC PO SERPL-MCNC: 144 MG/DL (ref 40–134)
HCT VFR BLD AUTO: 34.7 % (ref 34.8–46.1)
HGB BLD-MCNC: 11.4 G/DL (ref 11.5–15.4)
IMM GRANULOCYTES # BLD AUTO: 0.16 THOUSAND/UL (ref 0–0.2)
IMM GRANULOCYTES NFR BLD AUTO: 2 % (ref 0–2)
LYMPHOCYTES # BLD AUTO: 2.03 THOUSANDS/ΜL (ref 0.6–4.47)
LYMPHOCYTES NFR BLD AUTO: 22 % (ref 14–44)
MCH RBC QN AUTO: 31.1 PG (ref 26.8–34.3)
MCHC RBC AUTO-ENTMCNC: 32.9 G/DL (ref 31.4–37.4)
MCV RBC AUTO: 95 FL (ref 82–98)
MONOCYTES # BLD AUTO: 0.34 THOUSAND/ΜL (ref 0.17–1.22)
MONOCYTES NFR BLD AUTO: 4 % (ref 4–12)
NEUTROPHILS # BLD AUTO: 6.75 THOUSANDS/ΜL (ref 1.85–7.62)
NEUTS SEG NFR BLD AUTO: 71 % (ref 43–75)
NRBC BLD AUTO-RTO: 0 /100 WBCS
PLATELET # BLD AUTO: 193 THOUSANDS/UL (ref 149–390)
PMV BLD AUTO: 9.7 FL (ref 8.9–12.7)
RBC # BLD AUTO: 3.66 MILLION/UL (ref 3.81–5.12)
WBC # BLD AUTO: 9.39 THOUSAND/UL (ref 4.31–10.16)

## 2021-12-28 PROCEDURE — 86592 SYPHILIS TEST NON-TREP QUAL: CPT

## 2021-12-28 PROCEDURE — 36415 COLL VENOUS BLD VENIPUNCTURE: CPT

## 2021-12-28 PROCEDURE — 82950 GLUCOSE TEST: CPT

## 2021-12-28 PROCEDURE — 85025 COMPLETE CBC W/AUTO DIFF WBC: CPT

## 2021-12-29 ENCOUNTER — TELEPHONE (OUTPATIENT)
Dept: OBGYN CLINIC | Facility: CLINIC | Age: 31
End: 2021-12-29

## 2021-12-29 DIAGNOSIS — R73.09 ABNORMAL GLUCOSE TOLERANCE TEST (GTT): Primary | ICD-10-CM

## 2021-12-29 LAB — RPR SER QL: NORMAL

## 2021-12-30 ENCOUNTER — ROUTINE PRENATAL (OUTPATIENT)
Dept: OBGYN CLINIC | Facility: MEDICAL CENTER | Age: 31
End: 2021-12-30

## 2021-12-30 VITALS
HEIGHT: 62 IN | BODY MASS INDEX: 27.6 KG/M2 | WEIGHT: 150 LBS | SYSTOLIC BLOOD PRESSURE: 110 MMHG | DIASTOLIC BLOOD PRESSURE: 70 MMHG

## 2021-12-30 DIAGNOSIS — Z34.02 ENCOUNTER FOR SUPERVISION OF NORMAL FIRST PREGNANCY IN SECOND TRIMESTER: Primary | ICD-10-CM

## 2021-12-30 LAB
SL AMB  POCT GLUCOSE, UA: NEGATIVE
SL AMB POCT URINE PROTEIN: ABNORMAL

## 2021-12-30 PROCEDURE — PNV: Performed by: OBSTETRICS & GYNECOLOGY

## 2021-12-31 ENCOUNTER — APPOINTMENT (OUTPATIENT)
Dept: LAB | Facility: CLINIC | Age: 31
End: 2021-12-31
Payer: COMMERCIAL

## 2021-12-31 DIAGNOSIS — R73.09 ABNORMAL GLUCOSE TOLERANCE TEST (GTT): ICD-10-CM

## 2021-12-31 DIAGNOSIS — Z83.49 FAMILY HISTORY OF CYSTIC FIBROSIS: ICD-10-CM

## 2021-12-31 LAB — GLUCOSE P FAST SERPL-MCNC: 102 MG/DL (ref 65–99)

## 2021-12-31 PROCEDURE — 82951 GLUCOSE TOLERANCE TEST (GTT): CPT

## 2021-12-31 PROCEDURE — 81220 CFTR GENE COM VARIANTS: CPT

## 2021-12-31 PROCEDURE — 36415 COLL VENOUS BLD VENIPUNCTURE: CPT

## 2022-01-03 ENCOUNTER — TELEPHONE (OUTPATIENT)
Dept: OBGYN CLINIC | Facility: CLINIC | Age: 32
End: 2022-01-03

## 2022-01-03 NOTE — TELEPHONE ENCOUNTER
----- Message from Makayla Zhao, [place] sent at 12/31/2021 12:55 PM EST -----  Hello, can you please get Scarlet set up with [facility], abnormal 3 hour result  Thank you

## 2022-01-03 NOTE — TELEPHONE ENCOUNTER
Called patient to advise her of results  She said she never completed the 3 hr because when she got to the lab they said she didn't fast long enough   She said she is going to retake the 3hr on Saturday

## 2022-01-06 LAB
CF COMMENT: NORMAL
CFTR MUT ANL BLD/T: NORMAL

## 2022-01-08 ENCOUNTER — LAB (OUTPATIENT)
Dept: LAB | Facility: CLINIC | Age: 32
End: 2022-01-08
Payer: COMMERCIAL

## 2022-01-08 DIAGNOSIS — R73.09 IMPAIRED GLUCOSE TOLERANCE TEST: ICD-10-CM

## 2022-01-08 LAB — GLUCOSE P FAST SERPL-MCNC: 98 MG/DL (ref 65–99)

## 2022-01-08 PROCEDURE — 82951 GLUCOSE TOLERANCE TEST (GTT): CPT

## 2022-01-08 PROCEDURE — 36415 COLL VENOUS BLD VENIPUNCTURE: CPT

## 2022-01-10 ENCOUNTER — TELEPHONE (OUTPATIENT)
Dept: OBGYN CLINIC | Facility: CLINIC | Age: 32
End: 2022-01-10

## 2022-01-10 ENCOUNTER — ROUTINE PRENATAL (OUTPATIENT)
Dept: OBGYN CLINIC | Facility: MEDICAL CENTER | Age: 32
End: 2022-01-10

## 2022-01-10 VITALS
HEIGHT: 62 IN | BODY MASS INDEX: 28.01 KG/M2 | WEIGHT: 152.2 LBS | DIASTOLIC BLOOD PRESSURE: 68 MMHG | SYSTOLIC BLOOD PRESSURE: 110 MMHG

## 2022-01-10 DIAGNOSIS — Z34.02 ENCOUNTER FOR SUPERVISION OF NORMAL FIRST PREGNANCY IN SECOND TRIMESTER: Primary | ICD-10-CM

## 2022-01-10 DIAGNOSIS — O24.419 GESTATIONAL DIABETES MELLITUS (GDM) IN THIRD TRIMESTER, GESTATIONAL DIABETES METHOD OF CONTROL UNSPECIFIED: Primary | ICD-10-CM

## 2022-01-10 LAB
SL AMB  POCT GLUCOSE, UA: NEGATIVE
SL AMB POCT URINE PROTEIN: NEGATIVE

## 2022-01-10 PROCEDURE — PNV: Performed by: STUDENT IN AN ORGANIZED HEALTH CARE EDUCATION/TRAINING PROGRAM

## 2022-01-10 NOTE — TELEPHONE ENCOUNTER
----- Message from Baltazar Vines MD sent at 1/8/2022 10:49 AM EST -----  Fasting >95, unable to do 3 hour  Needs DP referral for new diagnosis GDM   Please inform patient

## 2022-01-10 NOTE — PROGRESS NOTES
Encounter for supervision of normal first pregnancy in second trimester  37 yo  at 29+5 here for routine ob visit  Feeling well  No contractions  Reported some leaking- SSE negative  No bleeding  Good fetal movement  Would like tdap after second covid dose  Fasting of 3 hour elevated- DP referral to be placed- message sent to triage   Return in 2 wks  The patient was counseled about the labor process  She was counseled regarding potential reasons that she may need a  section including arrest of dilation/descent, non-reassuring fetal status, or worsening maternal status  She was counseled on the risks of  including bleeding, infection, and injury to surrounding structures including the bowel and bladder  She was counseled that there are medical and surgical methods to manage excessive postpartum bleeding  She was counseled that in the event of excessive blood loss, she may require blood transfusion which includes a small risk of blood borne diseases such as hepatitis and HIV  The patient is OK with receiving a blood transfusion if necessary  The patient had an opportunity to ask questions and signed consent  She was counseled about the possible need for operative delivery using the vacuum or forceps and the indications for doing so  She was counseled that there is a small risk of  complications including intracranial hemorrhage, lacerations, nerve damage or fracture as well as the increased risk for more severe maternal laceration  The patient signed consent

## 2022-01-10 NOTE — PROGRESS NOTES
Patient presenting for routine prenatal visit  Patient is 29W5D, TIKI 03/23/2022  Patient having good fetal movement, denies any bleeding or cramping  Every once in awhile states to have a gush of clear, watery fluid  Enough to soak through underwear  States to be every 2-3 days     Up to date with flu vaccine  TDAP offered  Patient states to be getting 2nd dose of covid vaccine within the next two weeks  Considering getting tdap after second dose of covid vaccine  Urine is neg/neg    Procedure consent signed

## 2022-01-10 NOTE — TELEPHONE ENCOUNTER
Referral placed as directed  Pt contacted and informed as directed  Pt was grateful for the information and agreed to plan of action

## 2022-01-10 NOTE — ASSESSMENT & PLAN NOTE
37 yo  at 29+5 here for routine ob visit  Feeling well  No contractions  Reported some leaking- SSE negative  No bleeding  Good fetal movement  Would like tdap after second covid dose  Fasting of 3 hour elevated- DP referral to be placed- message sent to triage   Return in 2 wks  The patient was counseled about the labor process  She was counseled regarding potential reasons that she may need a  section including arrest of dilation/descent, non-reassuring fetal status, or worsening maternal status  She was counseled on the risks of  including bleeding, infection, and injury to surrounding structures including the bowel and bladder  She was counseled that there are medical and surgical methods to manage excessive postpartum bleeding  She was counseled that in the event of excessive blood loss, she may require blood transfusion which includes a small risk of blood borne diseases such as hepatitis and HIV  The patient is OK with receiving a blood transfusion if necessary  The patient had an opportunity to ask questions and signed consent  She was counseled about the possible need for operative delivery using the vacuum or forceps and the indications for doing so  She was counseled that there is a small risk of  complications including intracranial hemorrhage, lacerations, nerve damage or fracture as well as the increased risk for more severe maternal laceration  The patient signed consent

## 2022-01-13 ENCOUNTER — DOCUMENTATION (OUTPATIENT)
Dept: PERINATAL CARE | Facility: CLINIC | Age: 32
End: 2022-01-13

## 2022-01-13 ENCOUNTER — TELEMEDICINE (OUTPATIENT)
Dept: PERINATAL CARE | Facility: CLINIC | Age: 32
End: 2022-01-13
Payer: COMMERCIAL

## 2022-01-13 DIAGNOSIS — E66.3 OVERWEIGHT WITH BODY MASS INDEX (BMI) OF 26 TO 26.9 IN ADULT: ICD-10-CM

## 2022-01-13 DIAGNOSIS — Z3A.30 30 WEEKS GESTATION OF PREGNANCY: ICD-10-CM

## 2022-01-13 DIAGNOSIS — O24.410 DIET CONTROLLED GESTATIONAL DIABETES MELLITUS (GDM) IN THIRD TRIMESTER: Primary | ICD-10-CM

## 2022-01-13 PROCEDURE — G0108 DIAB MANAGE TRN  PER INDIV: HCPCS | Performed by: DIETITIAN, REGISTERED

## 2022-01-13 RX ORDER — BLOOD-GLUCOSE METER
KIT MISCELLANEOUS
Qty: 100 STRIP | Refills: 4 | Status: SHIPPED | OUTPATIENT
Start: 2022-01-13 | End: 2022-02-23

## 2022-01-13 RX ORDER — BLOOD-GLUCOSE METER
KIT MISCELLANEOUS
Qty: 1 KIT | Refills: 0 | Status: SHIPPED | OUTPATIENT
Start: 2022-01-13 | End: 2022-04-25 | Stop reason: ALTCHOICE

## 2022-01-13 RX ORDER — LANCETS 28 GAUGE
EACH MISCELLANEOUS
Qty: 100 EACH | Refills: 3 | Status: SHIPPED | OUTPATIENT
Start: 2022-01-13 | End: 2022-04-25 | Stop reason: ALTCHOICE

## 2022-01-13 NOTE — PROGRESS NOTES
Demographics:  Language: English  Ethnicity: White/   Country of Origin: [place]  Highest grade completed: Post Graduate   Occupation: Professional/ Managerial Marketing  manager  Employer Name:[employer]  Hours worked per week: Full Time (40 hours/week)  Shift worked: Morning (8 AM - 5 PM Monday thru Friday)    Personal & Family History:  Personal history of diabetes? no  Family members with diabetes: Grandfather  Paternal   How many total pregnancies have you had? 1  How many children do you have? 0  Are you having swelling or fluid retention? no  Have you been placed on bedrest? no    Nutrition & Physical Activity:  Do you exercise? yes  Type of Exercise: Walking every hour while working  Also does strength training & has a [employer] & treadmill  Frequency of Exercise: Daily  How many meals do you eat daily? 2  List times of meals: Breakfast: skips due to her IBS; will try to eat now  at 9 AM with a protein shake/ Lunch: 1 PM/ Dinner: 6 PM  How many snacks do you eat daily? 2  List times of snacks: Other between meals  What type of diet are you following at home? Other Has followed a macronutrient diet by weighing foods, reading food labels & using MyFitness Pal  to record her meals  Do you have special Moravian or ethnic dietary preferences? yes Loves goodies & pasta  Do you have any food allergies or intolerances? yes Pork & pork products & fatty foods cuase an upset stomach; dislikes eggs  Do you receive WIC or food stamps? no    Learning preferences: How do you learn best? Video & reading  How do you rate your health? Aaliyah Coleman  Who is your primary support person? Spouse  How do you cope with stress? Stress eater & exercises   How do you feel about being pregnant with diabetes? Agitated & frustrated with test result

## 2022-01-13 NOTE — PROGRESS NOTES
Virtual Regular Visit    Verification of patient location:  [place], [place]    Patient is located in the following state in which I hold an active license [place]      Assessment/Plan:    Problem List Items Addressed This Visit     None      Visit Diagnoses     Diet controlled gestational diabetes mellitus (GDM) in third trimester    -  Primary    Overweight with body mass index (BMI) of 26 to 26 9 in adult        30 weeks gestation of pregnancy                   Reason for visit is   Chief Complaint   Patient presents with    Virtual Regular Visit        Encounter provider Chauncey Howard    Provider located at [address] [place] [id number] [phone]      Recent Visits  No visits were found meeting these conditions  Showing recent visits within past 7 days and meeting all other requirements  Today's Visits  Date Type Provider Dept   01/13/22 1501 [facility]   Showing today's visits and meeting all other requirements  Future Appointments  No visits were found meeting these conditions  Showing future appointments within next 150 days and meeting all other requirements       The patient was identified by name and date of birth  Magali Hewitt was informed that this is a telemedicine visit and that the visit is being conducted through [address] and patient was informed this is a secure, HIPAA-complaint platform  She agrees to proceed     My office door was closed  No one else was in the room  She acknowledged consent and understanding of privacy and security of the video platform  The patient has agreed to participate and understands they can discontinue the visit at any time  Patient is aware this is a billable service  Pratima Shah is a 36 y o  female pregnant patient        HPI     Past Medical History:   Diagnosis Date    GERD (gastroesophageal reflux disease)     Ovarian cyst     Renal calculi        Past Surgical History:   Procedure Laterality Date    LAPAROSCOPIC CHOLECYSTECTOMY      OVARIAN CYST REMOVAL      TOOTH EXTRACTION         Current Outpatient Medications   Medication Sig Dispense Refill    Prenatal Vit-Fe Fumarate-FA (PRENATAL PO) Take by mouth       No current facility-administered medications for this visit  No Known Allergies    Review of Systems    Video Exam    There were no vitals filed for this visit  Physical Exam --not performed  I spent 60 minutes directly with the patient during this visit  VIRTUAL VISIT 2[facility] verbally agrees to participate in [employer]  Pt is aware that [employer] could be limited without vital signs or the ability to perform a full hands-on physical Renetta Vernon understands she or the provider may request at any time to terminate the video visit and request the patient to seek care or treatment in person  Thank you for referring your patient to [facility] Maternal Fetal Medicine Diabetes in Pregnancy Program      Chel Soriano is a  36 y o  female who presents today for Class 1  Patient is at 30w1d gestation, Estimated Date of Delivery: 3/23/22  Reviewed and updated the following from patients medical record: PMH, Problem List, Allergies, and Current Medications  Visit Diagnosis:  Diet controlled GDM    Discussed with patient pathophysiology of GDM, untreated hyperglycemia in pregnancy and maternal fetal complications including fetal macrosomia,  hypoglycemia, polyhydramnios, increased incidence of  section,  labor, and in severe cases fetal demise and still birth   Discussed importance of blood glucose monitoring, nutrition, and medication if necessary in achieving BG goals  Additional Pregnancy Complications:  Irritable bowel Syndrome and Overweight prior to pregnancy    History of cholecystectomy--needs to limit fat in her diet as a result  Labs:    Lab Results   Component Value Date    [id number] 144 (H) 2021       Lab Results   Component Value Date    GLUF 98 2022        No components found for: HGA1C    Medications:  No diabetes related medications    Anthropometrics:  Ht Readings from Last 3 Encounters:   01/10/22 5' 2" (1 575 m)   21 5' 2" (1 575 m)   21 5' 2" (1 575 m)     Wt Readings from Last 3 Encounters:   01/10/22 69 kg (152 lb 3 2 oz)   21 68 kg (150 lb)   21 65 3 kg (144 lb)     Pre-gravid weight: 64 9 kg (143 lb)  Pre-gravid BMI: 26 15  Weight Change: 4 173 kg (9 lb 3 2 oz)  Weight gain recommendations: BMI (25-29 9) 15-25 lbs  Comments: Patient may gain 15 pounds for the remainder of the pregnancy    Recent Ultra Sound Results:  Date: 21  Fetal Growth: Normal  JESSICA: Normal  Next US date: 22    Blood Glucose Monitoring:   Glucose Meter: OneTouch Verio Flex  Instructed on testing blood sugars: 4 x per day (Fasting, 2 hour after start of each meal)    Gave instruction on site selection, skin preparation, loading strips and lancet device, meter activation, obtaining blood sample, test strip and lancet disposal and storage, and recording log book entries  Patient has good understanding of material covered and was able to test their own blood sugar in office today       Instruction for reporting blood sugar results weekly via:  Phone: [phone]   OR  My Chart (Message with image attachment, or Glucose Flowsheet)    Goal Blood Sugar Ranges:   Fastin-90 mg/dL  1 hour after the start of each meal: 140 mg/dL or less  2 hours after start of each meal: 120 mg/dL or less    Meal Plan (daily calorie and protein needs):  Calories: 1800 calorie (CHO: 92-86-87-97-99-34) (PRO:2-1-3-1-3-2)    Type of Diet:Regular, but has follow macronutrient type diet by weighing all her foods, reading food labels & using [employer]   Additional Nutrition Concerns: Seldom eats breakfast due  to IBS  Needs to be awake for awhile before can eat breakfast  Limits fatty foods & avoids all pork & pork products since had cholecystectomy  Meal Plan Tips:  1  Patient was provided with a meal plan including 3 meals and 3 snacks  2  Discussed appropriate amounts of CHO, PRO, and Fat at each meal and snack  3  Reviewed CHO exchange list, and portion sizes for both CHO and PRO via food models  4  Instruction on how to read a food label  5  Provided suggested meal/snack options to increase nutrition and maintain consistent meal and snack intakes  6  Instructed on how to keep a 3-day food diary to be brought to follow- up appointment  7  Encouraged  patient to eat every 2 0-3 5 hours while awake  8  Encouraged patient to go no longer than 8-10 hours fasting overnight until first meal of the day  Physical Activity:  Discussed benefits of physical activity to optimize blood glucose control, encouraged activity at patient is physically able  Always consult a physician prior to starting an exercise program  Recommend 20-30 minutes daily  Patient Stated Goal: "I will eat 3 meals and 3 snacks each day, including protein at each"    Diabetes Self Management Support Plan outside of ongoing care: Spouse/Family    Learner/s Present:Learners Present: Patient   Barriers to Learning/Change: No Barriers  Expected Compliance: good    Date to report blood sugars: Thursday, 1/20/22  Class 2 (date): Friday, 1/21/22    Begin Time: 11:05 AM  End Time: 12:05 AM    It was a pleasure working with them today  Please feel free to call with any questions or concerns      Ciarra Shah  Diabetes Educator  [facility] Maternal Fetal Medicine  Diabetes in Pregnancy Program  [address] [place] [address],[address]  [address] [facility]

## 2022-01-21 ENCOUNTER — TELEMEDICINE (OUTPATIENT)
Dept: PERINATAL CARE | Facility: CLINIC | Age: 32
End: 2022-01-21
Payer: COMMERCIAL

## 2022-01-21 DIAGNOSIS — O24.410 DIET CONTROLLED GESTATIONAL DIABETES MELLITUS (GDM) IN THIRD TRIMESTER: Primary | ICD-10-CM

## 2022-01-21 DIAGNOSIS — E66.3 OVERWEIGHT WITH BODY MASS INDEX (BMI) OF 26 TO 26.9 IN ADULT: ICD-10-CM

## 2022-01-21 DIAGNOSIS — Z3A.31 31 WEEKS GESTATION OF PREGNANCY: ICD-10-CM

## 2022-01-21 PROCEDURE — G0109 DIAB MANAGE TRN IND/GROUP: HCPCS | Performed by: DIETITIAN, REGISTERED

## 2022-01-21 NOTE — PROGRESS NOTES
Virtual Regular Visit    Verification of patient location:  [place], [place]    Patient is located in the following state in which I hold an active license [place]      Assessment/Plan:    Problem List Items Addressed This Visit     None               Reason for visit is   Chief Complaint   Patient presents with    Virtual Regular Visit        Encounter provider Joselin Hanson    Provider located at [address] [place] [address] Dr Audie Dwyer [facility]  [phone]      Recent Visits  No visits were found meeting these conditions  Showing recent visits within past 7 days and meeting all other requirements  Today's Visits  Date Type Provider Dept   01/21/22 1501 [facility]   Showing today's visits and meeting all other requirements  Future Appointments  No visits were found meeting these conditions  Showing future appointments within next 150 days and meeting all other requirements       The patient was identified by name and date of birth  Bette Gutiérrez was informed that this is a telemedicine visit and that the visit is being conducted through [address] [place] [address] Now and patient was informed that this is a secure, HIPAA-compliant platform  She agrees to proceed     My office door was closed  No one else was in the room  She acknowledged consent and understanding of privacy and security of the video platform  The patient has agreed to participate and understands they can discontinue the visit at any time  Patient is aware this is a billable service  Amy Tang is a 36 y o  female pregnant patient        HPI     Past Medical History:   Diagnosis Date    GERD (gastroesophageal reflux disease)     Ovarian cyst     Renal calculi        Past Surgical History:   Procedure Laterality Date    LAPAROSCOPIC CHOLECYSTECTOMY      OVARIAN CYST REMOVAL      TOOTH EXTRACTION         Current Outpatient Medications Medication Sig Dispense Refill    Blood Glucose Monitoring Suppl (FreeStyle Freedom Lite) w/Device KIT Test 4 times daily 1 kit 0    FREESTYLE LITE test strip Test 4 times daily 100 strip 4    Lancets (freestyle) lancets Test 4 times daily 100 each 3    Prenatal Vit-Fe Fumarate-FA (PRENATAL PO) Take by mouth       No current facility-administered medications for this visit  No Known Allergies    Review of Systems    Video Exam    There were no vitals filed for this visit  Physical Exam --not performed  I spent 60 minutes directly with the patient during this visit  VIRTUAL VISIT 2185 [facility] verbally agrees to participate in [employer]  Pt is aware that [employer] could be limited without vital signs or the ability to perform a full hands-on physical Jaun Prabhu understands she or the provider may request at any time to terminate the video visit and request the patient to seek care or treatment in person  Thank you for referring your patient to [facility] Maternal Fetal Medicine Diabetes in Pregnancy Program      Aniya Zavaleta is a  36 y o  female who presents today for Class 2  Patient is at 31w2d gestation, Estimated Date of Delivery: 3/23/22  Reviewed and updated the following from patients medical record: PMH, Problem List, Allergies, and Current Medications      Visit Diagnosis:  Diet controlled GDM    Additional Pregnancy Complications:  overweight prior to pregnancy    Labs:    Lab Results   Component Value Date    [id number] 144 (H) 12/28/2021       Lab Results   Component Value Date    GLUF 98 01/08/2022        No components found for: HGA1C    Medications:  No diabetes related medications    Anthropometrics:  Ht Readings from Last 3 Encounters:   01/10/22 5' 2" (1 575 m)   12/30/21 5' 2" (1 575 m)   12/02/21 5' 2" (1 575 m)     Wt Readings from Last 3 Encounters:   01/10/22 69 kg (152 lb 3 2 oz)   12/30/21 68 kg (150 lb)   21 65 3 kg (144 lb)     Pre-gravid weight: 64 9 kg (143 lb)  Pre-gravid BMI: 26 15  Weight Change: 4 173 kg (9 lb 3 2 oz)  Weight gain recommendations: BMI (25-29 9) 15-25 lbs  Comments: Patient may gain 15 more pounds for the remainder of the pregnancy  Recent Ultra Sound Results:  Date:21  Fetal Growth:Normal  JESSICA: Normal  Next US date: 22    Blood Glucose Monitoring:  Reinforcement of blood glucose goals and reporting guidelines  Glucose Meter: Free Style [place] Lite  Testing blood sugars: 4 x per day (Fasting, 2 hour after start of each meal)  Method of reporting blood sugars:Glucose Flowsheet    Report blood sugar results weekly via:  Phone: [phone]   OR  My Chart (Message with image attachment, or Glucose Flowsheet)    Goal Blood Sugar Ranges:   Fastin-90 mg/dL  1 hour after the start of each meal: 140 mg/dL or less  2 hours after start of each meal: 120 mg/dL or less      BG Log:  Review of blood glucose log  Dicussed at Class 2 today      Advised patient to change bedtime snack to 1 CHO serving (15 gms) & 2-3 oz protein  Examples were provided  Meal Plan:  Calories: 1800 calorie (CHO: 72-52-32-12-85-66) (PRO:2-1-3-1-3-2)    Diet Type: Low Carbohydrate     Additional Nutrition concerns: Avoids juice, pork & pork products, juice, & artificial sweeteners  24 hr Diet Recall:  Provided at Class 2 today  Diet review: Reports she is hungry sometimes during the day  Sometimes misses 1 CHO serving at either lunch or dinner; advised to add non-starchy vegetables  Has been trying various foods at bedtime snack to control FBS & sometimes skips this snack too  Overall is following the meal plan well  Understands how to add extra foods throughout the day  Diet Instruction:  The patient was instructed on the followin  Individualized meal plan  2  Importance of consistent carbohydrate intake via 3 meals and 3 snacks per day   3   Importance of protein as it relates to blood glucose control  4  Encouraged  patient to eat every 2 0-3 5 hours while awake  5  Encouraged patient to go no longer than 8-10 hours fasting overnight until first meal of the day  6  Provided suggested meal/snack options to increase nutrition and maintain consistent meal and snack intakes  Physical Activity:  Discussed benefits of physical activity to optimize blood glucose control, encouraged activity at patient is physically able  Always consult a physician prior to starting an exercise program  Recommend 20-30 minutes daily  Is patient physically active? Yes Uses her [facility] & greil or does strnght training  Sick day Guidelines:   Patient advised that sickness will raise blood sugar and need to continue medication regimen as directed  If blood sugar is > 160 mg/dL twice in one day call doctor  Instructed on what to do when unable to consume normal meal plan  Hypoglycemia & Treatment Guidelines:  Reviewed what hypoglycemia is, signs and symptoms, and how to treat via the 15:15 rule  Post-Partum Guidelines:  Completion of 75 gm CHO 2 hr gtt at 6 weeks post-partum to check for Type 2 DM diagnosis    Breastfeeding Guidelines:  Continue GDM meal plan plus additional 350-500 calories daily  Stay hydrated by drinking 8-10 (8 oz ) fluids daily  Examples of protein and carbohydrate snacks provided  Dining Out & Travel Guidelines:  Patient advised to be prepared with extra diabetes supplies, medications, and snacks, as well as sticking to the same time schedule and portions eaten at home for meals and snacks  Maternal-Fetal Testing:    Ultrasounds- growth scans every 4 weeks     NST- twice weekly starting at 32nd week GA   JESSICA- weekly starting at 32 weeks GA    Patient Stated Goal: "I will eat 3 meals and 3 snacks each day, including protein at each"  Goal Assessment: Not on track    Diabetes Self Management Support Plan outside of ongoing care: Spouse/Family    Learner/s Present:Learners Present: Patient   Barriers to Learning/Change: No Barriers  Expected Compliance: good    Date to report blood sugars: Weekly  Follow up date: As Needed  Begin Time: 1 PM  End Time: 2 PM    It was a pleasure working with them today  Please feel free to call with any questions or concerns      Arabella De Leon  Diabetes Educator  [facility] Maternal Fetal Medicine  Diabetes in Pregnancy Program  [address] [facility], [address] [place] [address],[address]  [address]

## 2022-01-23 NOTE — PATIENT INSTRUCTIONS
Pregnancy at 31 to 34 Weeks   AMBULATORY CARE:   Changes happening with your body: You may continue to have symptoms such as shortness of breath, heartburn, contractions, or swelling of your ankles and feet  You may be gaining about 1 pound a week now  Seek care immediately if:   · You develop a severe headache that does not go away  · You have new or increased vision changes, such as blurred or spotted vision  · You have new or increased swelling in your face or hands  · You have vaginal spotting or bleeding  · Your water broke or you feel warm water gushing or trickling from your vagina  Call your obstetrician if:   · You have more than 5 contractions in 1 hour  · You notice any changes in your baby's movements  · You have abdominal cramps, pressure, or tightening  · You have a change in vaginal discharge  · You have chills or a fever  · You have vaginal itching, burning, or pain  · You have yellow, green, white, or foul-smelling vaginal discharge  · You have pain or burning when you urinate, less urine than usual, or pink or bloody urine  · You have questions or concerns about your condition or care  How to care for yourself at this stage of your pregnancy:       · Eat a variety of healthy foods  Healthy foods include fruits, vegetables, whole-grain breads, low-fat dairy foods, beans, lean meats, and fish  Drink liquids as directed  Ask how much liquid to drink each day and which liquids are best for you  Limit caffeine to less than 200 milligrams each day  Limit your intake of fish to 2 servings each week  Choose fish low in mercury such as canned light tuna, shrimp, salmon, cod, or tilapia  Do not  eat fish high in mercury such as swordfish, tilefish, rob mackerel, and shark  · Manage heartburn  by eating 4 or 5 small meals each day instead of large meals  Avoid spicy food  · Manage swelling  by lying down and putting your feet up           · Take prenatal vitamins as directed  Your need for certain vitamins and minerals, such as folic acid, increases during pregnancy  Prenatal vitamins provide some of the extra vitamins and minerals you need  Prenatal vitamins may also help to decrease the risk of certain birth defects  · Talk to your healthcare provider about exercise  Moderate exercise can help you stay fit  Your healthcare provider will help you plan an exercise program that is safe for you during pregnancy  · Do not smoke  Smoking increases your risk of a miscarriage and other health problems during your pregnancy  Smoking can cause your baby to be born too early or weigh less at birth  Ask your healthcare provider for information if you need help quitting  · Do not drink alcohol  Alcohol passes from your body to your baby through the placenta  It can affect your baby's brain development and cause fetal alcohol syndrome (FAS)  FAS is a group of conditions that causes mental, behavior, and growth problems  · Talk to your healthcare provider before you take any medicines  Many medicines may harm your baby if you take them when you are pregnant  Do not take any medicines, vitamins, herbs, or supplements without first talking to your healthcare provider  Never use illegal or street drugs (such as marijuana or cocaine) while you are pregnant  Safety tips during pregnancy:   · Avoid hot tubs and saunas  Do not use a hot tub or sauna while you are pregnant, especially during your first trimester  Hot tubs and saunas may raise your baby's temperature and increase the risk of birth defects  · Avoid toxoplasmosis  This is an infection caused by eating raw meat or being around infected cat feces  It can cause birth defects, miscarriages, and other problems  Wash your hands after you touch raw meat  Make sure any meat is well-cooked before you eat it  Avoid raw eggs and unpasteurized milk   Use gloves or ask someone else to clean your cat's litter box while you are pregnant  Changes happening with your baby:  By 34 weeks, your baby may weigh more than 5 pounds  Your baby will be about 12 ½ inches long from the top of the head to the rump (baby's bottom)  Your baby is gaining about ½ pound a week  Your baby's eyes open and close now  Your baby's kicks and movements are more forceful at this time  What you need to know about prenatal care: Your healthcare provider will check your blood pressure and weight  You may also need the following:  · A urine test  may also be done to check for sugar and protein  These can be signs of gestational diabetes or infection  Protein in your urine may also be a sign of preeclampsia  Preeclampsia is a condition that can develop during week 20 or later of your pregnancy  It causes high blood pressure, and it can cause problems with your kidneys and other organs  · A Tdap vaccine  may be recommended by your healthcare provider  · Fundal height  is a measurement of your uterus to check your baby's growth  This number is usually the same as the number of weeks that you have been pregnant  Your healthcare provider may also check your baby's position  · Your baby's heart rate  will be checked  Follow up with your obstetrician as directed:  Write down your questions so you remember to ask them during your visits  © Copyright [employer] 2021 Information is for End User's use only and may not be sold, redistributed or otherwise used for commercial purposes  All illustrations and images included in CareNotes® are the copyrighted property of A D A M , Inc  or [facility]   The above information is an  only  It is not intended as medical advice for individual conditions or treatments  Talk to your doctor, nurse or pharmacist before following any medical regimen to see if it is safe and effective for you      Kick Counts in Pregnancy   AMBULATORY CARE:   Kick counts  measure how much your baby is moving in your womb  A kick from your baby can be felt as a twist, turn, swish, roll, or jab  It is common to feel your baby kicking at 26 to 28 weeks of pregnancy  You may feel your baby kick as early as 20 weeks of pregnancy  You may want to start counting at 28 weeks  Contact your doctor immediately if:   · You feel a change in the number of kicks or movements of your baby  · You feel fewer than 10 kicks within 2 hours  · You have questions or concerns about your baby's movements  Why measure kick counts:  Your baby's movement may provide information about your baby's health  He or she may move less, or not at all, if there are problems  Your baby may move less if he or she is not getting enough oxygen or nutrition from the placenta  Do not smoke while you are pregnant  Smoking decreases the amount of oxygen that gets to your baby  Talk to your healthcare provider if you need help to quit smoking  Tell your healthcare provider as soon as you feel a change in your baby's movements  When to measure kick counts:   · Measure kick counts at the same time every day  · Measure kick counts when your baby is awake and most active  Your baby may be most active in the evening  How to measure kick counts:  Check that your baby is awake before you measure kick counts  You can wake up your baby by lightly pushing on your belly, walking, or drinking something cold  Your healthcare provider may tell you different ways to measure kick counts  You may be told to do the following:  · Use a chart or clock to keep track of the time you start and finish counting  · Sit in a chair or lie on your left side  · Place your hands on the largest part of your belly  · Count until you reach 10 kicks  Write down how much time it takes to count 10 kicks  · It may take 30 minutes to 2 hours to count 10 kicks  It should not take more than 2 hours to count 10 kicks      Follow up with your doctor as directed:  Write down your questions so you remember to ask them during your visits  © Copyright [employer] 2021 Information is for End User's use only and may not be sold, redistributed or otherwise used for commercial purposes  All illustrations and images included in CareNotes® are the copyrighted property of A D A M , Inc  or Alana Bliss  The above information is an  only  It is not intended as medical advice for individual conditions or treatments  Talk to your doctor, nurse or pharmacist before following any medical regimen to see if it is safe and effective for you  Perineal Massage    Perineal massage is recommended starting after 34 weeks in order to reduce risks of perineal tearing during childbirth  You have been provided and instructional sheet in your yellow 28 week prenatal packet  Early Labor Signs   AMBULATORY CARE:   Early labor signs and symptoms  are the changes in your body that signal your baby is getting ready to be delivered  Early labor signs can happen weeks, days or hours before delivery  Call 911 for any of the following:   · You have heavy vaginal bleeding  · You cannot get to the hospital before the baby starts to come out  Seek care immediately if:   · You have regular, painful contractions that are less than 5 minutes apart and last 30 to 70 seconds each  · You have a constant trickle or sudden gush of clear fluid from your vagina  · You notice a sudden decrease in your baby's movement  Contact your obstetrician or healthcare provider if:   · You have pain in your lower back or abdomen that does not get better when you change positions  · You have bloody mucus or show  · You have questions or concerns about your condition or care  Early labor signs and symptoms:   · Lightening  occurs when your baby drops inside your pelvis  You may feel increased pressure in your pelvis   This may happen a few weeks to a few hours before your labor begins  · Contractions  are cramps and tightening that occur in your uterus to help move the baby through your birth canal  Contractions occur regularly and more often each time  Each one lasts about 30 to 70 seconds, and gets stronger until you deliver your baby  Contractions do not go away with movement  The pain usually starts in your lower back and moves to your abdomen  · Effacement  occurs when your cervix softens and thins, so it can easily open for the baby  You will not be able to feel effacement  Your healthcare provider will examine your cervix for effacement  · Dilation  is widening of your cervix  Your healthcare provider will examine your cervix for dilation  Your cervix may start to dilate weeks before your baby is delivered  Your cervix will be fully opened and ready for delivery when it is dilated to 10 centimeters  · Increased discharge  from your vagina may occur  It may be brown, pink, clear, or slightly bloody  This discharge may also be called bloody show  Bloody show is a mucus plug that forms and blocks your cervix during pregnancy  The discharge may mean that your cervix is opening up and getting ready for delivery  · Rupture of membranes  is a sudden release of clear fluid from your vagina  Ruptured membranes means your water broke  Your healthcare provider may need to break your water if it does not happen on its own  False labor: You may have false labor signs, which are also called [place] contractions  False labor is common and may happen several weeks or days before your actual labor  The contractions are not regular, and do not get closer together  The pain is usually mild, does not worsen, and is felt only in front   [place] contractions may happen later in the day, and stop after you change position, walk, or rest   Follow up with your obstetrician or healthcare provider as directed:  Write down your questions so you remember to ask them during your visit  © Copyright [employer] 2021 Information is for End User's use only and may not be sold, redistributed or otherwise used for commercial purposes  All illustrations and images included in CareNotes® are the copyrighted property of A D A M , Inc  or Alana Bliss  The above information is an  only  It is not intended as medical advice for individual conditions or treatments  Talk to your doctor, nurse or pharmacist before following any medical regimen to see if it is safe and effective for you  Having Your Baby: The Labor Process   AMBULATORY CARE:   The labor process  is a series of 3 stages that your body goes through to deliver your baby  It is not known for sure what causes labor to begin  Hormones made by you and your baby and changes in your uterus may help labor to start  Labor usually starts 2 weeks before or after your due date  Most women do not have their baby exactly on their due date  Call your obstetrician if:   · You have vaginal spotting or bleeding  · Your water broke or you feel warm water gushing or trickling from your vagina  · You have more than 5 contractions in 1 hour  · You have bloody mucus or show  · You notice any changes in your baby's movements  · You have abdominal cramps, pressure, or tightening  · You have a change in vaginal discharge  · You have questions or concerns about your condition or care  First stage of labor: The first stage of labor includes latent (early) labor and active labor  This stage may last up to 12 hours if this is your first pregnancy  It may last up to 10 hours if you delivered a baby before  Your uterus will contract to prepare your cervix for delivery and to push your baby out of the birth canal  Your cervix will dilate (widen) and efface (soften and become thinner)  Your contractions may last from 30 to 60 seconds  The contractions usually start in the back and move to the front   You may also have a pink, clear, or slightly bloody discharge called bloody show  Bloody show is caused by the movement of a mucus plug from your cervix  During pregnancy the mucus plug blocks your cervix to prevent it from opening  What to do during early labor:  Early labor may last for several hours  You will most likely be at home during early labor  Rest as much as possible while you are at home  Have someone rub your back  It may be helpful to place ice packs on your lower back  Go for a short walk if you are able  Drink water and suck on ice chips  Ask your healthcare provider if it is okay to eat during early labor  How to know when you are in active labor: This stage may last up to 12 hours if this is your first pregnancy  It may last up to 10 hours if you delivered a baby before  Your contractions will get stronger, last longer, and happen more frequently  They will also become more intense and painful  Time your contractions from the beginning of one to the beginning of the next  Write this information down for 1 hour  Your healthcare provider will tell you when to go to the hospital or birthing center  This will be based on how many minutes apart your contractions are  Second stage of labor:  The second stage is the time between full cervix dilation and the birth of your baby  Your cervix will be completely dilated to 10 centimeters and your baby will be ready to be born  The second stage usually lasts 20 minutes to 2 hours  It may last up to 3 hours if this is your first baby  · You may be given antibiotics to fight a bacterial infection you have or prevent an infection during delivery  · Healthcare providers will help you find a position for giving birth that is comfortable for you  You can lie on your back, have your feet up in stirrups, or squat  · You may feel pressure on your rectum and the urge to push   This pressure is caused by the movement of your baby's head down the birth canal  Your healthcare provider will have you push when you feel the urge  He or she will guide your baby out of the birth canal  Forceps or suction may be used to help deliver your baby  You may also need an episiotomy (incision) to make the vaginal opening larger  This will make more room for your baby  Your perineum will be protected during delivery  This may be with a warm compress or massage of the area  · At least 1 minute after your baby is born, your healthcare provider will put clamps on the umbilical cord  The cord will then be cut  Your baby may be placed on your chest right away  He or she may also start breastfeeding  Third stage of labor:  The placenta (afterbirth) is delivered during this stage  After you give birth, your uterus will continue to contract to help push out the placenta  These contractions will begin 5 to 30 minutes after you give birth  Your healthcare provider will tell you when to push  You may have chills or shakiness during this stage  You may be given medicine to help prevent heavy bleeding that can happen during this stage  How to manage labor pain:  Pain can be managed naturally or with medicines  You can naturally manage pain by using relaxation methods and controlled breathing  There are different types of medicines that can be used to relieve pain while you are in labor  These medicines may be given through an IV or an epidural (thin catheter in your lower back)  Talk with your healthcare about your options for pain medicines if you choose to use them  Tell your provider if you prefer not to have any pain control medicines during labor  © Copyright [employer] [facility] 2021 Information is for End User's use only and may not be sold, redistributed or otherwise used for commercial purposes  All illustrations and images included in CareNotes® are the copyrighted property of [employer]  or Alana Bliss  The above information is an  only   It is not intended as medical advice for individual conditions or treatments  Talk to your doctor, nurse or pharmacist before following any medical regimen to see if it is safe and effective for you

## 2022-01-23 NOTE — ASSESSMENT & PLAN NOTE
Aniya Zavaleta is a 36 y o  Felix Crigler who presents for routine PNV  28 week labs reviewed: Glucola 1h & 3 hr abnormal following with DP, submitting BS, slightly elevated fastings, discussed with DP , has appt  for Growth scan  TWG 4 536 kg (10 lb)   Denies OB complaints  Good fetal movement  Denies contractions, cramping, leakage of fluid or vaginal bleeding       Reviewed  labor precautions and FKCs     Advised to start Perineal massage at 34-36 weeks   Pregnancy Essential guide and Baby and Me web site recommended

## 2022-01-27 ENCOUNTER — ROUTINE PRENATAL (OUTPATIENT)
Dept: OBGYN CLINIC | Facility: MEDICAL CENTER | Age: 32
End: 2022-01-27

## 2022-01-27 ENCOUNTER — DOCUMENTATION (OUTPATIENT)
Dept: PERINATAL CARE | Facility: CLINIC | Age: 32
End: 2022-01-27

## 2022-01-27 VITALS
DIASTOLIC BLOOD PRESSURE: 68 MMHG | HEIGHT: 62 IN | SYSTOLIC BLOOD PRESSURE: 110 MMHG | BODY MASS INDEX: 28.16 KG/M2 | WEIGHT: 153 LBS

## 2022-01-27 DIAGNOSIS — O24.410 DIET CONTROLLED GESTATIONAL DIABETES MELLITUS (GDM) IN THIRD TRIMESTER: Primary | ICD-10-CM

## 2022-01-27 DIAGNOSIS — O99.810 HYPERGLYCEMIA IN PREGNANCY: ICD-10-CM

## 2022-01-27 DIAGNOSIS — Z34.03 ENCOUNTER FOR SUPERVISION OF NORMAL FIRST PREGNANCY IN THIRD TRIMESTER: Primary | ICD-10-CM

## 2022-01-27 LAB
SL AMB  POCT GLUCOSE, UA: NEGATIVE
SL AMB POCT URINE PROTEIN: NEGATIVE

## 2022-01-27 PROCEDURE — PNV: Performed by: OBSTETRICS & GYNECOLOGY

## 2022-01-27 NOTE — PROGRESS NOTES
Problem   Encounter for Supervision of Normal First Pregnancy in Second Trimester    28 week labs: HGB 11 4, abnormal 1 hr Glucola @ 144,   3hr abnormal, Referred to DP  QNatal & MSAFP = Neg  Flu vaccine completed  Needs Tdap at 33 weeks D/t 2 week spacing between COVID vaccines 1st dose 21 & 2nd dose 22  28 week yellow folder given  Delivery consent on file  BP & L&D forms submitted        Encounter for supervision of normal first pregnancy in second trimester  Yenny Grimaldo is a 36 y o  Bertram Kinges who presents for routine PNV  28 week labs reviewed: Glucola 1h & 3 hr abnormal following with DP, submitting BS, slightly elevated fastings, discussed with DP , has appt  for Growth scan  TWG 4 536 kg (10 lb)   Denies OB complaints  Good fetal movement  Denies contractions, cramping, leakage of fluid or vaginal bleeding       Reviewed  labor precautions and FKCs     Advised to start Perineal massage at 34-36 weeks   Pregnancy Essential guide and Baby and Me web site recommended

## 2022-01-27 NOTE — PROGRESS NOTES
Date: 01/27/22  Yenny Needles  1990  Estimated Date of Delivery: 3/23/22  32w1d  OB/GYN: [facility]  Diagnosis:    -Irritable bowel Syndrome and Overweight prior to pregnancy   History of cholecystectomy - needs to limit fat in her diet as a result  Plan:  Call [phone] to schedule an appointment with our CRNP for FBS >90  Continue bedtime snack 1 CHO with 2/3 PRO, reinforce 8-10 hours fasting    -01/21/2022: Advised patient to change bedtime snack to 1 CHO serving (15 gms) & 2-3 oz protein  Diet: 1800 Gestational diabetes meal plan; 3 meals and 3 snacks  CHO: [id number]      PRO:2-1-3-1-3-2/3   Avoids juice, pork & pork products, juice, & artificial sweeteners  SMBG: Checks blood sugar 4 times per day; fasting and 2 hour start of each meal    Meter: Free style Freedom Lite glucose meter  Activity: Walks 30 minutes daily, follow OB recommendations  Support System: Significant Other  Patient Goal: I will eat 3 meals and 3 snacks each day, including protein at each  Education:  Class 1 completed with Khalida Us on 01/13/2022  Class 2 completed with Khalida Us on 01/21/2022       Labs  Labs ordered 01/27/2022     Ultrasounds  11/18/2021  Normal growth and JESSICA   Next US scheduled for 01/31/2022    Further fetal surveillance  Per MFM recommendations    Ilana Gao RN

## 2022-01-31 ENCOUNTER — ULTRASOUND (OUTPATIENT)
Dept: PERINATAL CARE | Facility: OTHER | Age: 32
End: 2022-01-31
Payer: COMMERCIAL

## 2022-01-31 VITALS
HEIGHT: 62 IN | HEART RATE: 106 BPM | WEIGHT: 152.56 LBS | SYSTOLIC BLOOD PRESSURE: 116 MMHG | BODY MASS INDEX: 28.07 KG/M2 | DIASTOLIC BLOOD PRESSURE: 77 MMHG

## 2022-01-31 DIAGNOSIS — Z3A.32 32 WEEKS GESTATION OF PREGNANCY: ICD-10-CM

## 2022-01-31 DIAGNOSIS — O24.410 DIET CONTROLLED GESTATIONAL DIABETES MELLITUS (GDM) IN THIRD TRIMESTER: Primary | ICD-10-CM

## 2022-01-31 PROCEDURE — 76816 OB US FOLLOW-UP PER FETUS: CPT | Performed by: OBSTETRICS & GYNECOLOGY

## 2022-01-31 PROCEDURE — 99213 OFFICE O/P EST LOW 20 MIN: CPT | Performed by: OBSTETRICS & GYNECOLOGY

## 2022-01-31 NOTE — Clinical Note
I saw that this patient was being referred for a meeting with Rylie due to elevated fasting blood sugars  As we reviewed her blood sugars today they do seem to be improving with dietary control  She thought her most elevated fasting blood sugar was around the time that she got her COVID vaccine  Her baby's growth today is normal   I told her that as long as her fasting blood sugars are not in the range of 95 or greater that we could manage her diabetes by diet       Thanks    Geovanny Oliver

## 2022-01-31 NOTE — PROGRESS NOTES
Leandro Graham has no complaints today  She reports regular fetal movements and does not report any problems  She is here today at 32w5d for an ultrasound for fetal growth    Problem list:  1  GDM A1 with fasting blood sugars improving with dietary changes  She reports a fasting of 84 this morning and 2 hour post prandials have largely all been in the normal range  She reports her highest fbs was the day after her covid vaccine  Ultrasound findings: The ultrasound today shows normal interval fetal growth and fluid  Pregnancy ultrasound has limitations and is unable to detect all forms of fetal congenital abnormalities  The inaccuracy in the EFW can be off by 1 lb either way in the third trimester  Specific counseling was provided on the following problems:  1  US for growth and fluid are appropriate today  I recommend a repeat ultrasound for growth in 4 weeks  I will schedule this here  If 20% or more FBS are >95 or 2 hr pp are >120 despite dietary changes she will be encouraged to start on insulin or a oral hypoglycemic such as metformin or glyburide  If medications are required to control her diabetes she will require twice weekly fetal testing with NST's from 32 weeks on  I would also recommend delivery around her due date  If she does not require any medications to control her diabetes then she can start fetal testing at 40 weeks and be delivered by 41 weeks  Postnatally after delivery, most patients with gestational diabetes can stop their insulin and gestational diabetes diet  Recommend she complete a 2 hour glucose tolerance test at 6 weeks postpartum  Patients with gestational diabetes have a higher risk for developing overt diabetes in the future  Recommend she be screened for diabetes yearly  Follow up recommended:   1  Recommend a follow-up ultrasound in 4 weeks for 1 last growth scan       Pre visit time reviewing her records   5 minutes  Face to face time 10 minutes  Post visit time on documentation of note, updating her problem list, adding orders and prescriptions 5 minutes  Procedures that were completed today were charged separately  The level of decision making was low level complexity      Karlie Colunga MD

## 2022-01-31 NOTE — LETTER
January 31, 2022     [address]arlene Garland [address] [id number] [place] [address]    Patient: Radha Sandoval   YOB: 1990   Date of Visit: 1/31/2022       Dear Dr Macho Otoole: Thank you for referring Evelyne Leiva to me for evaluation  Below are my notes for this consultation  If you have questions, please do not hesitate to call me  I look forward to following your patient along with you  Sincerely,        Torsten Cervantes MD        CC: No Recipients  Torsten Cervantes MD  1/31/2022  9:17 PM  Sign when Signing Visit  Radha Sandoval has no complaints today  She reports regular fetal movements and does not report any problems  She is here today at 32w5d for an ultrasound for fetal growth    Problem list:  1  GDM A1 with fasting blood sugars improving with dietary changes  She reports a fasting of 84 this morning and 2 hour post prandials have largely all been in the normal range  She reports her highest fbs was the day after her covid vaccine  Ultrasound findings: The ultrasound today shows normal interval fetal growth and fluid  Pregnancy ultrasound has limitations and is unable to detect all forms of fetal congenital abnormalities  The inaccuracy in the EFW can be off by 1 lb either way in the third trimester  Specific counseling was provided on the following problems:  1  US for growth and fluid are appropriate today  I recommend a repeat ultrasound for growth in 4 weeks  I will schedule this here  If 20% or more FBS are >95 or 2 hr pp are >120 despite dietary changes she will be encouraged to start on insulin or a oral hypoglycemic such as metformin or glyburide  If medications are required to control her diabetes she will require twice weekly fetal testing with NST's from 32 weeks on  I would also recommend delivery around her due date       If she does not require any medications to control her diabetes then she can start fetal testing at 40 weeks and be delivered by 41 weeks  Postnatally after delivery, most patients with gestational diabetes can stop their insulin and gestational diabetes diet  Recommend she complete a 2 hour glucose tolerance test at 6 weeks postpartum  Patients with gestational diabetes have a higher risk for developing overt diabetes in the future  Recommend she be screened for diabetes yearly  Follow up recommended:   1  Recommend a follow-up ultrasound in 4 weeks for 1 last growth scan  Pre visit time reviewing her records   5 minutes  Face to face time 10 minutes  Post visit time on documentation of note, updating her problem list, adding orders and prescriptions 5 minutes  Procedures that were completed today were charged separately  The level of decision making was low level complexity      Liza Amaya MD

## 2022-02-01 ENCOUNTER — DOCUMENTATION (OUTPATIENT)
Dept: PERINATAL CARE | Facility: CLINIC | Age: 32
End: 2022-02-01

## 2022-02-10 ENCOUNTER — ROUTINE PRENATAL (OUTPATIENT)
Dept: OBGYN CLINIC | Facility: MEDICAL CENTER | Age: 32
End: 2022-02-10
Payer: COMMERCIAL

## 2022-02-10 VITALS
BODY MASS INDEX: 28.34 KG/M2 | DIASTOLIC BLOOD PRESSURE: 76 MMHG | HEIGHT: 62 IN | SYSTOLIC BLOOD PRESSURE: 118 MMHG | WEIGHT: 154 LBS

## 2022-02-10 DIAGNOSIS — Z34.02 ENCOUNTER FOR SUPERVISION OF NORMAL FIRST PREGNANCY IN SECOND TRIMESTER: Primary | ICD-10-CM

## 2022-02-10 DIAGNOSIS — O24.410 DIET CONTROLLED GESTATIONAL DIABETES MELLITUS (GDM) IN THIRD TRIMESTER: ICD-10-CM

## 2022-02-10 DIAGNOSIS — Z83.49 FAMILY HISTORY OF CYSTIC FIBROSIS: ICD-10-CM

## 2022-02-10 DIAGNOSIS — Z23 NEED FOR TDAP VACCINATION: ICD-10-CM

## 2022-02-10 DIAGNOSIS — Z34.03 ENCOUNTER FOR SUPERVISION OF NORMAL FIRST PREGNANCY IN THIRD TRIMESTER: ICD-10-CM

## 2022-02-10 PROBLEM — Z34.00 SUPERVISION OF NORMAL FIRST PREGNANCY: Status: ACTIVE | Noted: 2021-09-07

## 2022-02-10 LAB
SL AMB  POCT GLUCOSE, UA: NEGATIVE
SL AMB POCT URINE PROTEIN: NEGATIVE

## 2022-02-10 PROCEDURE — PNV: Performed by: PHYSICIAN ASSISTANT

## 2022-02-10 PROCEDURE — 90715 TDAP VACCINE 7 YRS/> IM: CPT

## 2022-02-10 PROCEDURE — 90471 IMMUNIZATION ADMIN: CPT

## 2022-02-10 NOTE — ASSESSMENT & PLAN NOTE
Admits she has not been following calorie recommendation by DPP  Reports elevated fasting sugars over the past week, but rarely elevated PP  Advised adherence to dietary plan and to discuss control  She is aware of potential need for medication if uncontrolled  Aware of the risk of uncontrolled diabetes  Will need twice weekly APFS if requires medication

## 2022-02-10 NOTE — PROGRESS NOTES
Supervision of normal first pregnancy  Shandra French  is a 36 y o  Wendy Riley @34w1d who presents for routine prenatal visit  Denies LOF, vaginal bleeding, regular uterine contractions, cramping, headaches or visual changes  Reports good fetal movement  Taking PNV daily as prescribed  S/p COVID 19, Flu, TDap  Last growth scan 1/31/22: AC 26%, HC 6%, EFW 29%, AF normal  F/u scan scheduled 3/1  GBS at next visit  Plans to breastfeed  Has pump  Planning to take lactation class  Has pediatrician, car seat, and bags packed  Scheduled for birthing class this weekend  Reviewed perineal massage  Has yellow packet  Reviewed PTL/Labor precautions and FKC  Family history of cystic fibrosis  On 's side  Pt's carrier screening negative  Diet controlled gestational diabetes mellitus (GDM) in third trimester  Admits she has not been following calorie recommendation by DPP  Reports elevated fasting sugars over the past week, but rarely elevated PP  Advised adherence to dietary plan and to discuss control  She is aware of potential need for medication if uncontrolled  Aware of the risk of uncontrolled diabetes

## 2022-02-10 NOTE — ASSESSMENT & PLAN NOTE
Vianey Blackman  is a 36 y o  Alma Sampson @34w1d who presents for routine prenatal visit  Denies LOF, vaginal bleeding, regular uterine contractions, cramping, headaches or visual changes  Reports good fetal movement  Taking PNV daily as prescribed  S/p COVID 19, Flu, TDap  Last growth scan 1/31/22: AC 26%, HC 6%, EFW 29%, AF normal  F/u scan scheduled 3/1  GBS at next visit  Plans to breastfeed  Has pump  Planning to take lactation class  Has pediatrician, car seat, and bags packed  Scheduled for birthing class this weekend  Reviewed perineal massage  Has yellow packet  Reviewed PTL/Labor precautions and FKC

## 2022-02-10 NOTE — PROGRESS NOTES
Pt is here for prenatal ob visit today  GA: 34w1d    No question's or concerns at this time  Urine: Protein Negative / Glucose Negative  No LOF, VB, Contractions  +FM  UTD with flu and covid vaccines  Tdap given today  Tolerated well

## 2022-02-11 ENCOUNTER — TELEPHONE (OUTPATIENT)
Dept: PERINATAL CARE | Facility: CLINIC | Age: 32
End: 2022-02-11

## 2022-02-11 NOTE — TELEPHONE ENCOUNTER
Patient admits it is time to discuss and start medications  Patient will try to complete blood work today or Monday morning  Patient requested an appointment for this Monday 02/14/2022 at 1 pm because she is off work

## 2022-02-14 ENCOUNTER — APPOINTMENT (OUTPATIENT)
Dept: LAB | Facility: CLINIC | Age: 32
End: 2022-02-14
Payer: COMMERCIAL

## 2022-02-14 ENCOUNTER — OFFICE VISIT (OUTPATIENT)
Dept: PERINATAL CARE | Facility: CLINIC | Age: 32
End: 2022-02-14
Payer: COMMERCIAL

## 2022-02-14 VITALS
WEIGHT: 156 LBS | DIASTOLIC BLOOD PRESSURE: 75 MMHG | HEART RATE: 90 BPM | BODY MASS INDEX: 28.53 KG/M2 | SYSTOLIC BLOOD PRESSURE: 111 MMHG

## 2022-02-14 DIAGNOSIS — Z3A.34 34 WEEKS GESTATION OF PREGNANCY: ICD-10-CM

## 2022-02-14 DIAGNOSIS — O24.419 GESTATIONAL DIABETES MELLITUS (GDM) IN THIRD TRIMESTER, GESTATIONAL DIABETES METHOD OF CONTROL UNSPECIFIED: Primary | ICD-10-CM

## 2022-02-14 PROCEDURE — 99215 OFFICE O/P EST HI 40 MIN: CPT | Performed by: NURSE PRACTITIONER

## 2022-02-14 RX ORDER — INSULIN GLARGINE 100 [IU]/ML
20 INJECTION, SOLUTION SUBCUTANEOUS
Qty: 15 ML | Refills: 0 | Status: SHIPPED | OUTPATIENT
Start: 2022-02-14 | End: 2022-03-10 | Stop reason: HOSPADM

## 2022-02-14 RX ORDER — INSULIN GLARGINE 100 [IU]/ML
20 INJECTION, SOLUTION SUBCUTANEOUS
Qty: 15 ML | Refills: 0 | Status: SHIPPED | OUTPATIENT
Start: 2022-02-14 | End: 2022-02-14 | Stop reason: CLARIF

## 2022-02-14 NOTE — ASSESSMENT & PLAN NOTE
-A1c and CMP normal   -Due to FBS>90, start Lantus 20 units at 10 PM daily  To be titrated   -Add 3 AM glucose check for next 3 days    -Be sure to have bedtime snack with at least 15 grams of carbohydrates and 2 to 3 ounces of protein   -Continue GDM diet 3 meals and 3 snacks a day  -No more than 8 to 10 hours of fasting overnight   -Continue SMBG fasting and 2 hours post start of each meal   -Glucose goals fasting 60-90; 1 hour post 140 or less; 2 hours post meal; overnight   -Always have glucose available for hypoglycemia; use 15 by 15 rule   -Schedule NST twice a week and JESSICA weekly  -Fetal growth ultrasound as scheduled   -Continue follow up with OB and MFM  -Stay in close contact with diabetes team   -Complete diabetes screening 6 to 12 weeks post delivery      Lab Results   Component Value Date    HGBA1C 4 7 02/14/2022

## 2022-02-14 NOTE — PROGRESS NOTES
Assessment/Plan:    Gestational diabetes mellitus (GDM) in third trimester  -A1c and CMP normal   -Due to FBS>90, start Lantus 20 units at 10 PM daily  To be titrated   -Add 3 AM glucose check for next 3 days    -Be sure to have bedtime snack with at least 15 grams of carbohydrates and 2 to 3 ounces of protein   -Continue GDM diet 3 meals and 3 snacks a day  -No more than 8 to 10 hours of fasting overnight   -Continue SMBG fasting and 2 hours post start of each meal   -Glucose goals fasting 60-90; 1 hour post 140 or less; 2 hours post meal; overnight   -Always have glucose available for hypoglycemia; use 15 by 15 rule   -Schedule NST twice a week and JESSICA weekly  -Fetal growth ultrasound as scheduled   -Continue follow up with OB and MFM  -Stay in close contact with diabetes team   -Complete diabetes screening 6 to 12 weeks post delivery  Lab Results   Component Value Date    HGBA1C 4 7 02/14/2022       BMI 28 0-28 9,adult  -Pre-pregnancy weight 143 lbs  -Current weight 156 lbs  -TWG 13 lbs  -Recommended weight gain during pregnancy 15 to 25 lbs   -Continue GDM diet  Diagnoses and all orders for this visit:    Gestational diabetes mellitus (GDM) in third trimester, gestational diabetes method of control unspecified  -     Discontinue: insulin glargine (Lantus SoloStar) 100 units/mL injection pen; Inject 20 Units under the skin daily at bedtime At 10 PM daily  To be titrated  GDM  -     Insulin Pen Needle 31G X 5 MM MISC; Inject under the skin daily at bedtime Use one a day or as directed  -     insulin glargine (Basaglar KwikPen) 100 units/mL injection pen; Inject 20 Units under the skin daily at bedtime At 10 PM daily  To replace Lantus order  GDM  34 weeks gestation of pregnancy  -     Discontinue: insulin glargine (Lantus SoloStar) 100 units/mL injection pen; Inject 20 Units under the skin daily at bedtime At 10 PM daily  To be titrated  GDM    -     Insulin Pen Needle 31G X 5 MM MISC; Inject under the skin daily at bedtime Use one a day or as directed  -     insulin glargine (Basaglar KwikPen) 100 units/mL injection pen; Inject 20 Units under the skin daily at bedtime At 10 PM daily  To replace Lantus order  GDM  BMI 28 0-28 9,adult  -     insulin glargine (Basaglar KwikPen) 100 units/mL injection pen; Inject 20 Units under the skin daily at bedtime At 10 PM daily  To replace Lantus order  GDM  -Stay in close contact with diabetes education team   -Insulin requirements during pregnancy; basal/bolus concept and Metformin discussed  -Due to FBS>95 and current gestation; basal insulin preferred over Metformin    -Very important to maintain tight glucose control during pregnancy to decrease risk factors including fetal macrosomia; birth injury; risk of ; polyhydramnios; pre-term labor; pre-eclampsia;  hypoglycemia; jaundice and stillbirth    -Insulin pen quick reference sheet given and reviewed 15 by 15 rule in office    -Insulin pen education with returned demonstration without difficulty  · Insulin administration times, insulin action  · Hypoglycemia signs, symptoms and treatment  · Increase in maternal-fetal surveillance with insulin initiation          Subjective:      Patient ID: Simba Demarco is a 36 y o  female , 29 5/7 weeks gestation GDM following GDM diet and testing 4 times a day   present during visit  Fasting glucose readings are above goal  Reports increase in hunger and eating more than recommended by dietitian  Has been testing glucose post getting ready in the morning; encouraged to check first thing in am before starting day  Last US within normal, next US in 2 weeks  Positive fetal movement  Insulin pen education with returned demonstration by both Beto Holley and her        HPI    The following portions of the patient's history were reviewed and updated as appropriate: allergies, current medications, past family history, past medical history, past social history, past surgical history and problem list     No Known Allergies  Current Outpatient Medications on File Prior to Visit   Medication Sig Dispense Refill    Blood Glucose Monitoring Suppl (FreeStyle Freedom Lite) w/Device KIT Test 4 times daily 1 kit 0    FREESTYLE LITE test strip Test 4 times daily 100 strip 4    Lancets (freestyle) lancets Test 4 times daily 100 each 3    Prenatal Vit-Fe Fumarate-FA (PRENATAL PO) Take by mouth       No current facility-administered medications on file prior to visit  Review of Systems   Constitutional: Positive for fatigue  Negative for fever  Eyes: Negative for visual disturbance  Respiratory: Positive for shortness of breath (with activity  )  Negative for cough  Cardiovascular: Negative for chest pain, palpitations and leg swelling  Gastrointestinal: Positive for constipation  Negative for nausea and vomiting  Endocrine: Positive for polyphagia  Negative for polydipsia and polyuria  Genitourinary: Negative for difficulty urinating and vaginal bleeding  Neurological: Negative for headaches  Psychiatric/Behavioral: Negative for sleep disturbance  Objective:  Refer to glucose flowsheet, FBS>90 and >95  Component Value Date/Time    HGBA1C 4 7 02/14/2022 0741      /75 (BP Location: Right arm, Patient Position: Sitting, Cuff Size: Standard)   Pulse 90   Wt 70 8 kg (156 lb)   LMP 06/16/2021 (Exact Date)   BMI 28 53 kg/m²          Physical Exam  HENT:      Head: Normocephalic  Eyes:      Conjunctiva/sclera: Conjunctivae normal    Cardiovascular:      Rate and Rhythm: Normal rate and regular rhythm  Heart sounds: Normal heart sounds  Pulmonary:      Effort: Pulmonary effort is normal       Breath sounds: Normal breath sounds  Musculoskeletal:         General: Normal range of motion  Cervical back: Normal range of motion     Neurological:      Mental Status: She is alert and oriented to person, place, and time  Psychiatric:         Mood and Affect: Mood normal          Behavior: Behavior normal          Thought Content:  Thought content normal          Judgment: Judgment normal            Time in:1:05 PM  Time out:2:00 PM

## 2022-02-14 NOTE — PATIENT INSTRUCTIONS
-A1c and CMP normal   -Due to FBS>90, start Lantus 20 units at 10 PM daily  To be titrated   -Add 3 AM glucose check for next 3 days    -Be sure to have bedtime snack with at least 15 grams of carbohydrates and 2 to 3 ounces of protein   -Continue GDM diet 3 meals and 3 snacks a day  -No more than 8 to 10 hours of fasting overnight   -Continue SMBG fasting and 2 hours post start of each meal   -Glucose goals fasting 60-90; 1 hour post 140 or less; 2 hours post meal; overnight   -Always have glucose available for hypoglycemia; use 15 by 15 rule   -Schedule NST twice a week and JESSICA weekly  -Fetal growth ultrasound as scheduled   -Continue follow up with OB and MFM  -Stay in close contact with diabetes team   -Complete diabetes screening 6 to 12 weeks post delivery

## 2022-02-14 NOTE — ASSESSMENT & PLAN NOTE
-Pre-pregnancy weight 143 lbs  -Current weight 156 lbs  -TWG 13 lbs  -Recommended weight gain during pregnancy 15 to 25 lbs   -Continue GDM diet

## 2022-02-18 ENCOUNTER — ROUTINE PRENATAL (OUTPATIENT)
Dept: OBGYN CLINIC | Facility: MEDICAL CENTER | Age: 32
End: 2022-02-18

## 2022-02-18 VITALS
WEIGHT: 158 LBS | DIASTOLIC BLOOD PRESSURE: 70 MMHG | HEIGHT: 62 IN | SYSTOLIC BLOOD PRESSURE: 110 MMHG | BODY MASS INDEX: 29.08 KG/M2

## 2022-02-18 DIAGNOSIS — Z34.03 ENCOUNTER FOR SUPERVISION OF NORMAL FIRST PREGNANCY IN THIRD TRIMESTER: Primary | ICD-10-CM

## 2022-02-18 DIAGNOSIS — O24.414 INSULIN CONTROLLED GESTATIONAL DIABETES MELLITUS (GDM) IN THIRD TRIMESTER: ICD-10-CM

## 2022-02-18 LAB
SL AMB  POCT GLUCOSE, UA: NEGATIVE
SL AMB POCT URINE PROTEIN: NEGATIVE

## 2022-02-18 PROCEDURE — PNV: Performed by: CLINICAL NURSE SPECIALIST

## 2022-02-18 RX ORDER — INSULIN DETEMIR 100 [IU]/ML
24 INJECTION, SOLUTION SUBCUTANEOUS
COMMUNITY
Start: 2022-02-14 | End: 2022-03-10 | Stop reason: HOSPADM

## 2022-02-18 NOTE — PROGRESS NOTES
Richa Britt is a 36 y o   35w2d here for Routine Prenatal Visit    Prenatal Visit  Subjective:   Denies unusual vaginal discharge, LOF, VB, or ctx  Reports Fetus is active  GDM- insulin  Still with some FBS elevations > 90  MFM Started insulin this week  NST today      Objective:  Vitals:    22 1024   BP: 110/70     Pregravid Weight/BMI: 64 9 kg (143 lb) (BMI 26 15)  Current Weight: 71 7 kg (158 lb)   Total Weight Gain: 6 804 kg (15 lb)     OBGyn Exam  Physical Exam  Fetal Heart Rate: 135 , Fundal Height (cm): 35 cm    General: Not in acute distress and appearing well nourished and well groomed  Genitourinary:          Pelvic exam exam deferred   Cardiovascular:   Rate and Rhythm: Normal rate  Pulmonary:    Effort: normal, not labored  Abdominal:  Abdomen is soft and gravid    Musculoskeletal: Active movement of all extremities, no gross limitations of ROM     Edema: None  Neurological:   Mental Status: She is alert and oriented to person, place, and time  Skin: General: Skin is warm and dry  Psychiatric:    Mood and Affect: Mood normal       Behavior: Behavior normal      Assessment & Plan:        1  Encounter for supervision of normal first pregnancy in third trimester  Assessment & Plan:  Overall pt doing well  Reviewed common mood changes towards end of pregnancy and s/s Postpartum depression to be aware of  Reviewed benefits of perineal massage - to be done 1-4 times per week x 5-10 minutes- education in AVS given  Educated on GBS culture which will be completed at the 36 wk visit  We again reviewed the S/S PTL and importance of consistent/regular FM  Reviewed process for [facility] and encouraged pt to call with any decreases in fetal movement    Orders:  -     POCT urine dip    2  Insulin controlled gestational diabetes mellitus (GDM) in third trimester  Assessment & Plan:  Just started insulin this week, due to elevated FBS  Starting twice weekly testing   NST today reactive    Lab Results   Component Value Date    HGBA1C 4 7 02/14/2022         3   BMI 28 0-28 9,adult      Gabriel March, PHILIPPE  2/18/2022

## 2022-02-18 NOTE — ASSESSMENT & PLAN NOTE
Overall pt doing well  Reviewed common mood changes towards end of pregnancy and s/s Postpartum depression to be aware of  Reviewed benefits of perineal massage - to be done 1-4 times per week x 5-10 minutes- education in AVS given  Educated on GBS culture which will be completed at the 36 wk visit  We again reviewed the S/S PTL and importance of consistent/regular FM   Reviewed process for [facility] and encouraged pt to call with any decreases in fetal movement

## 2022-02-18 NOTE — PROGRESS NOTES
PNV 35w2d    Patient denies any lof or vb  Patient said she was having some ctx  Patient has +fm  Patient urine is neg/neg  Patient has no concerns today

## 2022-02-18 NOTE — ASSESSMENT & PLAN NOTE
Just started insulin this week, due to elevated FBS  Starting twice weekly testing   NST today reactive    Lab Results   Component Value Date    HGBA1C 4 7 02/14/2022

## 2022-02-18 NOTE — PROGRESS NOTES
Lab: 35w5d    NST Visit Only  Star time: 8:02  LOF/VB/CTX: None  FM: Good  Urine: -/-    C/o of headache yesterday, no blurred vision or dizziness   Also reported an glucose reading of 100 today after insulin which she felt was high for her

## 2022-02-21 ENCOUNTER — ROUTINE PRENATAL (OUTPATIENT)
Dept: OBGYN CLINIC | Facility: CLINIC | Age: 32
End: 2022-02-21

## 2022-02-21 VITALS
WEIGHT: 157.8 LBS | HEART RATE: 82 BPM | HEIGHT: 62 IN | DIASTOLIC BLOOD PRESSURE: 60 MMHG | BODY MASS INDEX: 29.04 KG/M2 | SYSTOLIC BLOOD PRESSURE: 90 MMHG

## 2022-02-21 DIAGNOSIS — Z3A.35 35 WEEKS GESTATION OF PREGNANCY: Primary | ICD-10-CM

## 2022-02-21 LAB
SL AMB  POCT GLUCOSE, UA: NEGATIVE
SL AMB POCT URINE PROTEIN: NEGATIVE

## 2022-02-21 PROCEDURE — PNV: Performed by: OBSTETRICS & GYNECOLOGY

## 2022-02-21 NOTE — PROGRESS NOTES
Discussed HA with patient - returned slightly today  Tylenol did help yesterday  Encouraged her to take another dose today, if HA does not resolve in next 1-2 hours to call the office, would advise triage for labs/treatment at that time  NST reactive, has NST/JESSICA with [facility] later this week     IOL to be scheduled 39 0-39 6wks

## 2022-02-24 ENCOUNTER — ULTRASOUND (OUTPATIENT)
Dept: PERINATAL CARE | Facility: OTHER | Age: 32
End: 2022-02-24
Payer: COMMERCIAL

## 2022-02-24 ENCOUNTER — TELEPHONE (OUTPATIENT)
Dept: PERINATAL CARE | Facility: CLINIC | Age: 32
End: 2022-02-24

## 2022-02-24 VITALS
DIASTOLIC BLOOD PRESSURE: 69 MMHG | BODY MASS INDEX: 29.43 KG/M2 | HEART RATE: 105 BPM | SYSTOLIC BLOOD PRESSURE: 112 MMHG | WEIGHT: 159.61 LBS

## 2022-02-24 DIAGNOSIS — O24.414 INSULIN CONTROLLED GESTATIONAL DIABETES MELLITUS (GDM) IN THIRD TRIMESTER: ICD-10-CM

## 2022-02-24 DIAGNOSIS — Z3A.36 36 WEEKS GESTATION OF PREGNANCY: Primary | ICD-10-CM

## 2022-02-24 PROCEDURE — 76815 OB US LIMITED FETUS(S): CPT | Performed by: OBSTETRICS & GYNECOLOGY

## 2022-02-24 PROCEDURE — 59025 FETAL NON-STRESS TEST: CPT | Performed by: OBSTETRICS & GYNECOLOGY

## 2022-02-24 NOTE — PATIENT INSTRUCTIONS
Kick Counts in Pregnancy   AMBULATORY CARE:   Kick counts  measure how much your baby is moving in your womb  A kick from your baby can be felt as a twist, turn, swish, roll, or jab  It is common to feel your baby kicking at 26 to 28 weeks of pregnancy  You may feel your baby kick as early as 20 weeks of pregnancy  You may want to start counting at 28 weeks  Contact your doctor immediately if:   · You feel a change in the number of kicks or movements of your baby  · You feel fewer than 10 kicks within 2 hours  · You have questions or concerns about your baby's movements  Why measure kick counts:  Your baby's movement may provide information about your baby's health  He or she may move less, or not at all, if there are problems  Your baby may move less if he or she is not getting enough oxygen or nutrition from the placenta  Do not smoke while you are pregnant  Smoking decreases the amount of oxygen that gets to your baby  Talk to your healthcare provider if you need help to quit smoking  Tell your healthcare provider as soon as you feel a change in your baby's movements  When to measure kick counts:   · Measure kick counts at the same time every day  · Measure kick counts when your baby is awake and most active  Your baby may be most active in the evening  How to measure kick counts:  Check that your baby is awake before you measure kick counts  You can wake up your baby by lightly pushing on your belly, walking, or drinking something cold  Your healthcare provider may tell you different ways to measure kick counts  You may be told to do the following:  · Use a chart or clock to keep track of the time you start and finish counting  · Sit in a chair or lie on your left side  · Place your hands on the largest part of your belly  · Count until you reach 10 kicks  Write down how much time it takes to count 10 kicks  · It may take 30 minutes to 2 hours to count 10 kicks   It should not take more than 2 hours to count 10 kicks  Follow up with your doctor as directed:  Write down your questions so you remember to ask them during your visits  © Copyright [employer] 2021 Information is for End User's use only and may not be sold, redistributed or otherwise used for commercial purposes  All illustrations and images included in CareNotes® are the copyrighted property of A D A M , Inc  or [facility] [employer] [employer]   The above information is an  only  It is not intended as medical advice for individual conditions or treatments  Talk to your doctor, nurse or pharmacist before following any medical regimen to see if it is safe and effective for you

## 2022-02-24 NOTE — TELEPHONE ENCOUNTER
Phone call to patient regarding fasting glucose in the 60's this morning with symptoms  Reported adjusting insulin time to earlier and eating snack at a different time  Recommended decreasing insulin to 22 units daily and being consist with timing every night  Also to add more protein to bedtime snack

## 2022-02-24 NOTE — LETTER
February 24, 2022     [address] [place] [address]    Patient: Ryann Nelson   YOB: 1990   Date of Visit: 2/24/2022       Dear Dr Melanie Yepez: Thank you for referring Rigo Clifford to me for evaluation  Below are my notes for this consultation  If you have questions, please do not hesitate to call me  I look forward to following your patient along with you  Sincerely,        Sana Arboleda MD        CC: No Recipients  Sana Arboleda MD  2/24/2022  7:01 AM  Sign when Signing Visit  Please refer to the [facility] ultrasound report in Ob Procedures for additional information regarding today's visit

## 2022-02-24 NOTE — LETTER
NST sleeve cover sheet    Patient name: Kori Bang  : 1990  MRN: [id number]    TIKI: Estimated Date of Delivery: 3/23/22    Obstetrician: ___Sloga____________________________    Reason(s) for testing: A2 GDM  __________________________________________      Testing frequency:    _x__ 2x/wk  ___ 1x/wk  ___ Dopplers  ___ BPP?       Last growth scan: __________________________________________

## 2022-02-24 NOTE — PROGRESS NOTES
Please refer to the [facility] ultrasound report in Ob Procedures for additional information regarding today's visit

## 2022-02-27 ENCOUNTER — NURSE TRIAGE (OUTPATIENT)
Dept: OTHER | Facility: OTHER | Age: 32
End: 2022-02-27

## 2022-02-28 NOTE — TELEPHONE ENCOUNTER
Regarding: pelvic pain, contractions, fluid loss, 36w4d  ----- Message from Nimo Oden MA sent at 2/27/2022  8:14 PM EST -----  "I am 36w4d, earlier today I had significant fluid leakage  It was quite a bit  I started having pretty severe pressure in my pelvis area any time I try to move  I also have been having contractions for the last two   They are coming about 5-10 minutes apart"

## 2022-02-28 NOTE — TELEPHONE ENCOUNTER
Spoke with on call provider regarding patients concerns and provider recommends rest and hydration and monitoring for now  Let patient know providers recommendations and advised she call back with painful contractions or if she is leaking fluid again  Patient verbalized understanding  Reason for Disposition   Increased pressure in pelvic area    Answer Assessment - Initial Assessment Questions  1  ONSET: "When did the symptoms begin?"         Starting 11am     2  CONTRACTIONS: "Describe the contractions that you are having " (e g , duration, frequency, regularity, severity)      Pelvic pressure only when walking for the last couple of hours, tightening of abdomen about every 5-10 minutes for the last couple of hours     3  TIKI: "What date are you expecting to deliver?"      3/23/22    4  PARITY: "Have you had a baby before?" If Yes, ask: "How long did the labor last?"      First baby     5  FETAL MOVEMENT: "Has the baby's movement decreased or changed significantly from normal?"      Denies change in movement     6   OTHER SYMPTOMS: "Do you have any other symptoms?" (e g , leaking fluid from vagina, fever, hand/facial swelling)      Around 11am leaked fluid soaked through sweatpants, small amount of fluid leaking for about 30 minutes after    Protocols used: PREGNANCY - LABOR - -ADULT-

## 2022-03-01 ENCOUNTER — ULTRASOUND (OUTPATIENT)
Dept: PERINATAL CARE | Facility: OTHER | Age: 32
End: 2022-03-01
Payer: COMMERCIAL

## 2022-03-01 ENCOUNTER — ROUTINE PRENATAL (OUTPATIENT)
Dept: OBGYN CLINIC | Facility: MEDICAL CENTER | Age: 32
End: 2022-03-01

## 2022-03-01 ENCOUNTER — ROUTINE PRENATAL (OUTPATIENT)
Dept: PERINATAL CARE | Facility: OTHER | Age: 32
End: 2022-03-01
Payer: COMMERCIAL

## 2022-03-01 VITALS — BODY MASS INDEX: 29.69 KG/M2 | WEIGHT: 161 LBS | DIASTOLIC BLOOD PRESSURE: 78 MMHG | SYSTOLIC BLOOD PRESSURE: 120 MMHG

## 2022-03-01 VITALS
WEIGHT: 161 LBS | HEIGHT: 62 IN | DIASTOLIC BLOOD PRESSURE: 70 MMHG | HEART RATE: 82 BPM | SYSTOLIC BLOOD PRESSURE: 106 MMHG | BODY MASS INDEX: 29.63 KG/M2

## 2022-03-01 DIAGNOSIS — O24.414 INSULIN CONTROLLED GESTATIONAL DIABETES MELLITUS (GDM) IN THIRD TRIMESTER: ICD-10-CM

## 2022-03-01 DIAGNOSIS — Z3A.36 36 WEEKS GESTATION OF PREGNANCY: ICD-10-CM

## 2022-03-01 DIAGNOSIS — Z36.89 ENCOUNTER FOR ULTRASOUND TO ASSESS FETAL GROWTH: ICD-10-CM

## 2022-03-01 DIAGNOSIS — Z34.93 PRENATAL CARE IN THIRD TRIMESTER: ICD-10-CM

## 2022-03-01 DIAGNOSIS — Z34.03 ENCOUNTER FOR SUPERVISION OF NORMAL FIRST PREGNANCY IN THIRD TRIMESTER: Primary | ICD-10-CM

## 2022-03-01 DIAGNOSIS — O24.414 INSULIN CONTROLLED GESTATIONAL DIABETES MELLITUS (GDM) IN THIRD TRIMESTER: Primary | ICD-10-CM

## 2022-03-01 DIAGNOSIS — Z36.89 ENCOUNTER FOR ULTRASOUND TO ASSESS FETAL GROWTH: Primary | ICD-10-CM

## 2022-03-01 LAB
SL AMB  POCT GLUCOSE, UA: NORMAL
SL AMB POCT URINE PROTEIN: NORMAL

## 2022-03-01 PROCEDURE — 76816 OB US FOLLOW-UP PER FETUS: CPT | Performed by: OBSTETRICS & GYNECOLOGY

## 2022-03-01 PROCEDURE — 59025 FETAL NON-STRESS TEST: CPT | Performed by: OBSTETRICS & GYNECOLOGY

## 2022-03-01 PROCEDURE — 87150 DNA/RNA AMPLIFIED PROBE: CPT | Performed by: NURSE PRACTITIONER

## 2022-03-01 PROCEDURE — PNV: Performed by: NURSE PRACTITIONER

## 2022-03-01 PROCEDURE — NC001 PR NO CHARGE

## 2022-03-01 NOTE — LETTER
March 2, 2022     [address]    Patient: Ubaldo Black   YOB: 1990   Date of Visit: 3/1/2022       Thee López,    I recommended delivery at 38 weeks given sub-optimal glycemic control  She is agreeable to 3/11/22 and beyond  Could you arrange? We discussed expectations for labor induction yesterday  Thanks! Sincerely,        Elodia Guerrero MD        CC: No Recipients  Elodia Guerrero MD  3/1/2022  6:17 PM  Sign when Signing Visit  [facility] [address] Анна Ortiz was seen today at 36w6d for NST (found under the pregnancy episode) which I reviewed the RN assessment and agree, and fetal growth ultrasound (see ultrasound report under OB procedures tab)  See ultrasound report under "OB Procedures" tab    Please don't hesitate to contact our office with any concerns or questions   -Elodia Guerrero MD

## 2022-03-01 NOTE — PROGRESS NOTES
[address]: Ms Harmony Mojica was seen today at 36w6d for NST (found under the pregnancy episode) which I reviewed the RN assessment and agree, and fetal growth ultrasound (see ultrasound report under OB procedures tab)  See ultrasound report under "OB Procedures" tab    Please don't hesitate to contact our office with any concerns or questions   -Natalie Beltran MD

## 2022-03-01 NOTE — PATIENT INSTRUCTIONS
Pregnancy at [address] to [address]:   What changes are happening with my body? You are considered full term at the beginning of 37 weeks  Your breathing may be easier if your baby has moved down into a head-down position  You may need to urinate more often because the baby may be pressing on your bladder  You may also feel more discomfort and get tired easily  How do I care for myself at this stage of my pregnancy? · Eat a variety of healthy foods  Healthy foods include fruits, vegetables, whole-grain breads, low-fat dairy foods, beans, lean meats, and fish  Drink liquids as directed  Ask how much liquid to drink each day and which liquids are best for you  Limit caffeine to less than 200 milligrams each day  Limit your intake of fish to 2 servings each week  Choose fish low in mercury such as canned light tuna, shrimp, salmon, cod, or tilapia  Do not  eat fish high in mercury such as swordfish, tilefish, rob mackerel, and shark  · Take prenatal vitamins as directed  Your need for certain vitamins and minerals, such as folic acid, increases during pregnancy  Prenatal vitamins provide some of the extra vitamins and minerals you need  Prenatal vitamins may also help to decrease the risk of certain birth defects  · Rest as needed  Put your feet up if you have swelling in your ankles and feet  · Talk to your healthcare provider about exercise  Moderate exercise can help you stay fit  Your healthcare provider will help you plan an exercise program that is safe for you during pregnancy  · Do not smoke  Smoking increases your risk of a miscarriage and other health problems during your pregnancy  Smoking can cause your baby to be born early or weigh less at birth  Ask your healthcare provider for information if you need help quitting  · Do not drink alcohol  Alcohol passes from your body to your baby through the placenta   It can affect your baby's brain development and cause fetal alcohol syndrome (FAS)  FAS is a group of conditions that causes mental, behavior, and growth problems  · Talk to your healthcare provider before you take any medicines  Many medicines may harm your baby if you take them when you are pregnant  Do not take any medicines, vitamins, herbs, or supplements without first talking to your healthcare provider  Never use illegal or street drugs (such as marijuana or cocaine) while you are pregnant  What are some safety tips during pregnancy? · Avoid hot tubs and saunas  Do not use a hot tub or sauna while you are pregnant, especially during your first trimester  Hot tubs and saunas may raise your baby's temperature and increase the risk of birth defects  · Avoid toxoplasmosis  This is an infection caused by eating raw meat or being around infected cat feces  It can cause birth defects, miscarriages, and other problems  Wash your hands after you touch raw meat  Make sure any meat is well-cooked before you eat it  Avoid raw eggs and unpasteurized milk  Use gloves or ask someone else to clean your cat's litter box while you are pregnant  · Ask your healthcare provider about travel  The most comfortable time to travel is during the second trimester  Ask your provider if you can travel after 36 weeks  You may not be able to travel in an airplane after 36 weeks  He or she may also recommend you avoid long road trips  What changes are happening with my baby? By 38 weeks, your baby may weigh between 6 and 9 pounds  Your baby may be about 14 inches long from the top of the head to the rump (baby's bottom)  Your baby hears well enough to know your voice  As your baby gets larger, you may feel fewer kicks and more stretching and rolling  Your baby may move into a head-down position  Your baby will also rest lower in your abdomen  What do I need to know about prenatal care?   Your healthcare provider will check your blood pressure and weight  You may also need the following:  · A urine test  may also be done to check for sugar and protein  These can be signs of gestational diabetes or infection  Protein in your urine may also be a sign of preeclampsia  Preeclampsia is a condition that can develop during week 20 or later of your pregnancy  It causes high blood pressure, and it can cause problems with your kidneys and other organs  · A gestational diabetes screen  may be done  Your healthcare provider may order either a 1-step or 2-step oral glucose tolerance test (OGTT)  ? 1-step OGTT:  Your blood sugar level will be tested after you have not eaten for 8 hours (fasting)  You will then be given a glucose drink  Your level will be tested again 1 hour and 2 hours after you finish the drink  ? 2-step OGTT:  You do not have to fast for the first part of the test  You will have the glucose drink at any time of day  Your blood sugar level will be checked 1 hour later  If your blood sugar is higher than a certain level, another test will be ordered  You will fast and your blood sugar level will be tested  You will have the glucose drink  Your blood will be tested again 1 hour, 2 hours, and 3 hours after you finish the glucose drink  · A blood test  may be done to check for anemia (low iron level)  · A Tdap vaccine  may be recommended by your healthcare provider  · A group B strep test  is a test that is done to check for group B strep infection  Group B strep is a type of bacteria that may be found in the vagina or rectum  It can be passed to your baby during delivery if you have it  Your healthcare provider will take swab your vagina or rectum and send the sample to the lab for tests  · Fundal height  is a measurement of your uterus to check your baby's growth  This number is usually the same as the number of weeks that you have been pregnant  Your healthcare provider may also check your baby's position      · Your baby's heart rate  will be checked  When should I seek immediate care? · You develop a severe headache that does not go away  · You have new or increased vision changes, such as blurred or spotted vision  · You have new or increased swelling in your face or hands  · You have vaginal spotting or bleeding  · Your water broke or you feel warm water gushing or trickling from your vagina  When should I call my obstetrician? · You have more than 5 contractions in 1 hour  · You notice any changes in your baby's movements  · You have abdominal cramps, pressure, or tightening  · You have a change in vaginal discharge  · You have chills or a fever  · You have vaginal itching, burning, or pain  · You have yellow, green, white, or foul-smelling vaginal discharge  · You have pain or burning when you urinate, less urine than usual, or pink or bloody urine  · You have questions or concerns about your condition or care  CARE AGREEMENT:   You have the right to help plan your care  Learn about your health condition and how it may be treated  Discuss treatment options with your healthcare providers to decide what care you want to receive  You always have the right to refuse treatment  The above information is an  only  It is not intended as medical advice for individual conditions or treatments  Talk to your doctor, nurse or pharmacist before following any medical regimen to see if it is safe and effective for you  © Copyright [employer] 2022 Information is for End User's use only and may not be sold, redistributed or otherwise used for commercial purposes   All illustrations and images included in CareNotes® are the copyrighted property of [employer]  or [address] [place] [address] [employer]

## 2022-03-01 NOTE — LETTER
NST sleeve cover sheet    Patient name: Bernie Woody  : 1990  MRN: [id number]    TIKI: Estimated Date of Delivery: 3/23/22    Obstetrician: ____________sloga_______________    Reason(s) for testing:  ____________________________A2GDM______________      Testing frequency:    __x_ 2x/wk  ___ 1x/wk  ___ Dopplers  ___ BPP?       Last growth scan: __________________________________________

## 2022-03-01 NOTE — LETTER
March 2, 2022     Miriam Dillard, [address] [facility] [facility]    Patient: Opal Bass   YOB: 1990   Date of Visit: 3/1/2022       Thee Youngblood    I recommended 38 week delivery for Marleni President given the fluctuations in fasting glucose and episodes of hypoglycemia that have it made it challenging to titrate basal insulin  Thanks! Sincerely,        Ayden Read MD        CC: No Recipients  Ayden Read MD  3/1/2022  6:17 PM  Sign when Signing Visit  [address]: Ms Lukasz Goldman was seen today at 36w6d for NST (found under the pregnancy episode) which I reviewed the RN assessment and agree, and fetal growth ultrasound (see ultrasound report under OB procedures tab)  See ultrasound report under "OB Procedures" tab    Please don't hesitate to contact our office with any concerns or questions   -Ayden Read MD

## 2022-03-01 NOTE — PROGRESS NOTES
Pt here for routine prenatal visit  She is 36W6D  Having a Girl! Denies bleeding  Concerns about cramping and LOF since Sunday  Cramps while walking  She did have a headache for 8hrs yesterday  Stated she was sweating through the night last night and her fasting sugars were 115 this morning while she is on insulin  Good FM    Urine: Pro:- Glu:-

## 2022-03-01 NOTE — ASSESSMENT & PLAN NOTE
Carlos Gale  is a 36 y o  Yael Meek @36w6d who presents for routine prenatal visit  Denies LOF, vaginal bleeding, regular uterine contractions, headaches or visual changes  Reports good fetal movement  Having some cramping -more on right side  Advised belly band-declines PT  Called in Sunday with gush of fluid-none since  Vaginal Ph 4 0 and no fluid noted  Taking PNV daily as prescribed  Flu, COVID and TDap up to date  Reviewed PTL/Labor precautions and FKC  GBS collected today  Cx thick and closed  Plans to breastfeed    Having a girl  Has yellow packet

## 2022-03-01 NOTE — PROGRESS NOTES
Insulin controlled gestational diabetes mellitus (GDM) in third trimester    Lab Results   Component Value Date    HGBA1C 4 7 2022   Recently started on insulin  Twice weekly NST and weekly JESSICA-scheduled today for growth/JESSICA  Followed by DP  Delivery between 39w0d and 39w6d  Growth  AC 26%, HC 6%, growth 29% and normal AFIs  Having some elevated fasting sugars  Will contact DP to discuss  36 weeks gestation of pregnancy  For routine prenatal visit    Prenatal care in third trimester    Leonila Win  is a 36 y o   @36w6d who presents for routine prenatal visit  Denies LOF, vaginal bleeding, regular uterine contractions, headaches or visual changes  Reports good fetal movement  Having some cramping -more on right side  Advised belly band-declines PT  Called in  with gush of fluid-none since  Vaginal Ph 4 0 and no fluid noted  Taking PNV daily as prescribed  Flu, COVID and TDap up to date  Reviewed PTL/Labor precautions and FKC  GBS collected today  Cx thick and closed  Plans to breastfeed    Having a girl  Has yellow packet

## 2022-03-01 NOTE — ASSESSMENT & PLAN NOTE
Lab Results   Component Value Date    HGBA1C 4 7 02/14/2022   Recently started on insulin  Twice weekly NST and weekly JESSICA-scheduled today for growth/JESSICA  Followed by DP  Delivery between 39w0d and 39w6d  Growth 1/31 AC 26%, HC 6%, growth 29% and normal AFIs  Having some elevated fasting sugars  Will contact DP to discuss

## 2022-03-02 PROBLEM — Z3A.34 34 WEEKS GESTATION OF PREGNANCY: Status: RESOLVED | Noted: 2022-02-14 | Resolved: 2022-03-02

## 2022-03-02 PROBLEM — O99.810 HYPERGLYCEMIA DURING PREGNANCY: Status: ACTIVE | Noted: 2022-03-02

## 2022-03-02 LAB — GP B STREP DNA SPEC QL NAA+PROBE: NEGATIVE

## 2022-03-03 ENCOUNTER — DOCUMENTATION (OUTPATIENT)
Dept: PERINATAL CARE | Facility: CLINIC | Age: 32
End: 2022-03-03

## 2022-03-03 ENCOUNTER — TELEPHONE (OUTPATIENT)
Dept: OBGYN CLINIC | Facility: CLINIC | Age: 32
End: 2022-03-03

## 2022-03-03 NOTE — PROGRESS NOTES
Date: 03/03/22  Breanna Vinson  1990  Estimated Date of Delivery: 3/23/22  37w1d  OB/GYN: Gallito Linares  Diagnosis:    -Irritable bowel Syndrome and Overweight prior to pregnancy   History of cholecystectomy - needs to limit fat in her diet as a result  Plan:  Increase Levemir from 22 up to 24 units at 10 pm daily    -spoke with patient over the phone @ 09:14 am  Patient rather go up to 24 units, not 26 units (20%) due to what happened last time  Be consistent with bedtime snack        Diet: 1800 Gestational diabetes meal plan; 3 meals and 3 snacks  CHO: [id number]      PRO:2-1-3-1-3-2/3   Avoids juice, pork & pork products, juice, & artificial sweeteners  SMBG: Checks blood sugar 4 times per day; fasting and 2 hour start of each meal    Meter: Free style Freedom Lite glucose meter  Activity: Walks 30 minutes daily, follow OB recommendations  Support System: Significant Other  Patient Goal: I will eat 3 meals and 3 snacks each day, including protein at each  Education:  Class 1 completed with Armin Ro on 01/13/2022  Class 2 completed with Armin Ro on 01/21/2022       Labs  02/14/2022 A1c 4 7%    Ultrasounds  11/18/2021  Normal growth and JESSICA   01/31/2022 Normal growth and JESSICA    EFW: 29 % AC: 26 % HC: 6 %    Further fetal surveillance  Twice weekly NST, weekly JESSICA     Sweta Rodríguez RN

## 2022-03-03 NOTE — TELEPHONE ENCOUNTER
Called [facility] , per Zhao Milligan, pt scheduled for medical induction of labor, Friday, 3/11/22 at 0700  38-2 wks -( ok per Dr Yamileth Jacob pt to inform - reviewed directions & covid masking policies with pt  Routed to [facility] on-call providers

## 2022-03-03 NOTE — TELEPHONE ENCOUNTER
Pt says moe told her they recommend she is induced by 38 weeks which is next week  Pt asking how she would go about this

## 2022-03-04 ENCOUNTER — ROUTINE PRENATAL (OUTPATIENT)
Dept: PERINATAL CARE | Facility: OTHER | Age: 32
End: 2022-03-04
Payer: COMMERCIAL

## 2022-03-04 VITALS
HEIGHT: 61 IN | BODY MASS INDEX: 30.42 KG/M2 | SYSTOLIC BLOOD PRESSURE: 109 MMHG | HEART RATE: 90 BPM | DIASTOLIC BLOOD PRESSURE: 73 MMHG

## 2022-03-04 DIAGNOSIS — Z3A.37 37 WEEKS GESTATION OF PREGNANCY: ICD-10-CM

## 2022-03-04 DIAGNOSIS — O24.414 INSULIN CONTROLLED GESTATIONAL DIABETES MELLITUS (GDM) IN THIRD TRIMESTER: ICD-10-CM

## 2022-03-04 PROCEDURE — 59025 FETAL NON-STRESS TEST: CPT | Performed by: OBSTETRICS & GYNECOLOGY

## 2022-03-04 NOTE — PROGRESS NOTES
NST was done today  Pt  Reported active fetal movement at home between visit  Daily fetal kick count reviewed and emphasized  Patient verbalized understanding of all and was receptive      Tevin Antonino, RN

## 2022-03-04 NOTE — LETTER
2022     [address] [address] [place] [address]    Patient: Yue Omer   YOB: 1990   Date of Visit: 3/4/2022       Francia Guzman,    Thanks for arranging her IOL  NST was reactive today  Sincerely,         GENETIC COUNSELING MATT        CC: No Recipients  Gunnar Núñez MD  3/4/2022  4:16 PM  Sign when Signing Visit  [address]: Ms Tiffany Kennedy was seen today at 37w2d for NST (found under the pregnancy episode) which I reviewed the RN assessment and agree    Please don't hesitate to contact our office with any concerns or questions   -Gunnar Núñez MD

## 2022-03-04 NOTE — PROGRESS NOTES
[address]: Ms Jc Cruz was seen today at 37w2d for NST (found under the pregnancy episode) which I reviewed the RN assessment and agree    Please don't hesitate to contact our office with any concerns or questions   -Troy Barrera MD

## 2022-03-06 NOTE — PATIENT INSTRUCTIONS
Thank you for choosing us for your  care today  If you have any questions about your ultrasound or care, please do not hesitate to contact us or your primary obstetrician  Please go to Labor and Delivery now for evaluation  The address of [address] is [place] [address] ([facility] [place])  Some general instructions for your pregnancy are:     Protect against coronavirus: get vaccinated - pregnant women are increased risk of severe COVID  Notify your primary care doctor if you have any symptoms   Exercise: Aim for 22 minutes per day (150 minutes per week) of regular exercise  Walking is great!  Nutrition: aim for calcium-rich and iron-rich foods as well as healthy sources of protein   Learn about Preeclampsia: preeclampsia is a common, serious high blood pressure complication in pregnancy  A blood pressure of 031EABJ (systolic or top number) or 88BHSI (diastolic or bottom number) is not normal and needs evaluation by your doctor  Aspirin is sometimes prescribed in early pregnancy to prevent preeclampsia in women with risk factors - ask your obstetrician if you should be on this medication   If you smoke, try to reduce how many cigarettes you smoke or try to quit completely  Do not vape   Other warning signs to watch out for in pregnancy or postpartum: chest pain, obstructed breathing or shortness of breath, seizures, thoughts of hurting yourself or your baby, bleeding, a painful or swollen leg, fever, or headache (see [employer] POST-BIRTH Warning Signs campaign)  If these happen call 911  Itching is also not normal in pregnancy and if you experience this, especially over your hands and feet, potentially worse at night, notify your doctors  Kick Counts in Pregnancy   AMBULATORY CARE:   Kick counts  measure how much your baby is moving in your womb  A kick from your baby can be felt as a twist, turn, swish, roll, or jab   It is common to feel your baby kicking at 26 to 28 weeks of pregnancy  You may feel your baby kick as early as 20 weeks of pregnancy  You may want to start counting at 28 weeks  Contact your doctor immediately if:   You feel a change in the number of kicks or movements of your baby  You feel fewer than 10 kicks within 2 hours  You have questions or concerns about your baby's movements  Why measure kick counts:  Your baby's movement may provide information about your baby's health  He or she may move less, or not at all, if there are problems  Your baby may move less if he or she is not getting enough oxygen or nutrition from the placenta  Do not smoke while you are pregnant  Smoking decreases the amount of oxygen that gets to your baby  Talk to your healthcare provider if you need help to quit smoking  Tell your healthcare provider as soon as you feel a change in your baby's movements  When to measure kick counts:   Measure kick counts at the same time every day  Measure kick counts when your baby is awake and most active  Your baby may be most active in the evening  How to measure kick counts:  Check that your baby is awake before you measure kick counts  You can wake up your baby by lightly pushing on your belly, walking, or drinking something cold  Your healthcare provider may tell you different ways to measure kick counts  You may be told to do the following:  Use a chart or clock to keep track of the time you start and finish counting  Sit in a chair or lie on your left side  Place your hands on the largest part of your belly  Count until you reach 10 kicks  Write down how much time it takes to count 10 kicks  It may take 30 minutes to 2 hours to count 10 kicks  It should not take more than 2 hours to count 10 kicks  Follow up with your doctor as directed:  Write down your questions so you remember to ask them during your visits     © Copyright [employer] 2022 Information is for End User's use only and may not be sold, redistributed or otherwise used for commercial purposes  All illustrations and images included in CareNotes® are the copyrighted property of [employer] M [employer]  or Alana Bliss  The above information is an  only  It is not intended as medical advice for individual conditions or treatments  Talk to your doctor, nurse or pharmacist before following any medical regimen to see if it is safe and effective for you  Kick Counts in Pregnancy   AMBULATORY CARE:   Kick counts  measure how much your baby is moving in your womb  A kick from your baby can be felt as a twist, turn, swish, roll, or jab  It is common to feel your baby kicking at 26 to 28 weeks of pregnancy  You may feel your baby kick as early as 20 weeks of pregnancy  You may want to start counting at 28 weeks  Contact your doctor immediately if:   · You feel a change in the number of kicks or movements of your baby  · You feel fewer than 10 kicks within 2 hours  · You have questions or concerns about your baby's movements  Why measure kick counts:  Your baby's movement may provide information about your baby's health  He or she may move less, or not at all, if there are problems  Your baby may move less if he or she is not getting enough oxygen or nutrition from the placenta  Do not smoke while you are pregnant  Smoking decreases the amount of oxygen that gets to your baby  Talk to your healthcare provider if you need help to quit smoking  Tell your healthcare provider as soon as you feel a change in your baby's movements  When to measure kick counts:   · Measure kick counts at the same time every day  · Measure kick counts when your baby is awake and most active  Your baby may be most active in the evening  How to measure kick counts:  Check that your baby is awake before you measure kick counts   You can wake up your baby by lightly pushing on your belly, walking, or drinking something cold  Your healthcare provider may tell you different ways to measure kick counts  You may be told to do the following:  · Use a chart or clock to keep track of the time you start and finish counting  · Sit in a chair or lie on your left side  · Place your hands on the largest part of your belly  · Count until you reach 10 kicks  Write down how much time it takes to count 10 kicks  · It may take 30 minutes to 2 hours to count 10 kicks  It should not take more than 2 hours to count 10 kicks  Follow up with your doctor as directed:  Write down your questions so you remember to ask them during your visits  © Copyright [employer] 2022 Information is for End User's use only and may not be sold, redistributed or otherwise used for commercial purposes  All illustrations and images included in CareNotes® are the copyrighted property of [employer]  or Alana Pruett   The above information is an  only  It is not intended as medical advice for individual conditions or treatments  Talk to your doctor, nurse or pharmacist before following any medical regimen to see if it is safe and effective for you

## 2022-03-07 ENCOUNTER — ROUTINE PRENATAL (OUTPATIENT)
Dept: OBGYN CLINIC | Facility: MEDICAL CENTER | Age: 32
End: 2022-03-07

## 2022-03-07 VITALS
HEIGHT: 61 IN | WEIGHT: 162 LBS | BODY MASS INDEX: 30.58 KG/M2 | SYSTOLIC BLOOD PRESSURE: 100 MMHG | DIASTOLIC BLOOD PRESSURE: 60 MMHG

## 2022-03-07 DIAGNOSIS — O24.414 INSULIN CONTROLLED GESTATIONAL DIABETES MELLITUS (GDM) IN THIRD TRIMESTER: Primary | ICD-10-CM

## 2022-03-07 DIAGNOSIS — Z3A.37 37 WEEKS GESTATION OF PREGNANCY: ICD-10-CM

## 2022-03-07 DIAGNOSIS — Z34.93 PRENATAL CARE IN THIRD TRIMESTER: ICD-10-CM

## 2022-03-07 LAB
SL AMB  POCT GLUCOSE, UA: NEGATIVE
SL AMB POCT URINE PROTEIN: NEGATIVE

## 2022-03-07 PROCEDURE — PNV: Performed by: NURSE PRACTITIONER

## 2022-03-07 NOTE — PROGRESS NOTES
PNV 37w5d    Patient denies any lof or vb  Patient is having some ctx  Patient has +fm  Patient urine is neg/neg  Patient is having a Girl  Patient has no concerns today

## 2022-03-07 NOTE — ASSESSMENT & PLAN NOTE
Lab Results   Component Value Date    HGBA1C 4 7 02/14/2022   Some elevated sugars  Scheduled for IOL 3/11 @ 0700 per MFM    Continue twice weekly APFS  #/1 growth AC 35%, HC <5%, growth 35% and JESSICA=14 0  IOL consent signed and written information provided

## 2022-03-07 NOTE — PROGRESS NOTES
Insulin controlled gestational diabetes mellitus (GDM) in third trimester    Lab Results   Component Value Date    HGBA1C 4 7 2022   Some elevated sugars  Scheduled for IOL 3/11 @ 0700 per MFM  Continue twice weekly APFS  #/1 growth AC 35%, HC <5%, growth 35% and JESSICA=14 0  IOL consent signed and written information provided     Prenatal care in third trimester    Oralia Esquivel  is a 36 y o   @37w5d who presents for routine prenatal visit  Denies LOF, vaginal bleeding, regular uterine contractions, headaches or visual changes  Having some cramping  Reports good fetal movement  Taking PNV daily as prescribed  Flu,COVID and TDap up to date  Reviewed labor precautions and FKC  GBS neg  Plans to breastfeed    Having a girl  Has peds/turned in birth plan  Has yellow packet            37 weeks gestation of pregnancy  For routine PN visit

## 2022-03-07 NOTE — ASSESSMENT & PLAN NOTE
Markomark Andrade  is a 36 y o  Lorena Dec @37w5d who presents for routine prenatal visit  Denies LOF, vaginal bleeding, regular uterine contractions, headaches or visual changes  Having some cramping  Reports good fetal movement  Taking PNV daily as prescribed  Flu,COVID and TDap up to date  Reviewed labor precautions and C  GBS neg  Plans to breastfeed    Having a girl  Has peds/turned in birth plan  Has yellow packet

## 2022-03-07 NOTE — PATIENT INSTRUCTIONS
Pregnancy at [address] to [address]:   What changes are happening with my body? You are considered full term at the beginning of 37 weeks  Your breathing may be easier if your baby has moved down into a head-down position  You may need to urinate more often because the baby may be pressing on your bladder  You may also feel more discomfort and get tired easily  How do I care for myself at this stage of my pregnancy? · Eat a variety of healthy foods  Healthy foods include fruits, vegetables, whole-grain breads, low-fat dairy foods, beans, lean meats, and fish  Drink liquids as directed  Ask how much liquid to drink each day and which liquids are best for you  Limit caffeine to less than 200 milligrams each day  Limit your intake of fish to 2 servings each week  Choose fish low in mercury such as canned light tuna, shrimp, salmon, cod, or tilapia  Do not  eat fish high in mercury such as swordfish, tilefish, rob mackerel, and shark  · Take prenatal vitamins as directed  Your need for certain vitamins and minerals, such as folic acid, increases during pregnancy  Prenatal vitamins provide some of the extra vitamins and minerals you need  Prenatal vitamins may also help to decrease the risk of certain birth defects  · Rest as needed  Put your feet up if you have swelling in your ankles and feet  · Talk to your healthcare provider about exercise  Moderate exercise can help you stay fit  Your healthcare provider will help you plan an exercise program that is safe for you during pregnancy  · Do not smoke  Smoking increases your risk of a miscarriage and other health problems during your pregnancy  Smoking can cause your baby to be born early or weigh less at birth  Ask your healthcare provider for information if you need help quitting  · Do not drink alcohol  Alcohol passes from your body to your baby through the placenta   It can affect your baby's brain development and cause fetal alcohol syndrome (FAS)  FAS is a group of conditions that causes mental, behavior, and growth problems  · Talk to your healthcare provider before you take any medicines  Many medicines may harm your baby if you take them when you are pregnant  Do not take any medicines, vitamins, herbs, or supplements without first talking to your healthcare provider  Never use illegal or street drugs (such as marijuana or cocaine) while you are pregnant  What are some safety tips during pregnancy? · Avoid hot tubs and saunas  Do not use a hot tub or sauna while you are pregnant, especially during your first trimester  Hot tubs and saunas may raise your baby's temperature and increase the risk of birth defects  · Avoid toxoplasmosis  This is an infection caused by eating raw meat or being around infected cat feces  It can cause birth defects, miscarriages, and other problems  Wash your hands after you touch raw meat  Make sure any meat is well-cooked before you eat it  Avoid raw eggs and unpasteurized milk  Use gloves or ask someone else to clean your cat's litter box while you are pregnant  · Ask your healthcare provider about travel  The most comfortable time to travel is during the second trimester  Ask your provider if you can travel after 36 weeks  You may not be able to travel in an airplane after 36 weeks  He or she may also recommend you avoid long road trips  What changes are happening with my baby? By 38 weeks, your baby may weigh between 6 and 9 pounds  Your baby may be about 14 inches long from the top of the head to the rump (baby's bottom)  Your baby hears well enough to know your voice  As your baby gets larger, you may feel fewer kicks and more stretching and rolling  Your baby may move into a head-down position  Your baby will also rest lower in your abdomen  What do I need to know about prenatal care?   Your healthcare provider will check your blood pressure and weight  You may also need the following:  · A urine test  may also be done to check for sugar and protein  These can be signs of gestational diabetes or infection  Protein in your urine may also be a sign of preeclampsia  Preeclampsia is a condition that can develop during week 20 or later of your pregnancy  It causes high blood pressure, and it can cause problems with your kidneys and other organs  · A gestational diabetes screen  may be done  Your healthcare provider may order either a 1-step or 2-step oral glucose tolerance test (OGTT)  ? 1-step OGTT:  Your blood sugar level will be tested after you have not eaten for 8 hours (fasting)  You will then be given a glucose drink  Your level will be tested again 1 hour and 2 hours after you finish the drink  ? 2-step OGTT:  You do not have to fast for the first part of the test  You will have the glucose drink at any time of day  Your blood sugar level will be checked 1 hour later  If your blood sugar is higher than a certain level, another test will be ordered  You will fast and your blood sugar level will be tested  You will have the glucose drink  Your blood will be tested again 1 hour, 2 hours, and 3 hours after you finish the glucose drink  · A blood test  may be done to check for anemia (low iron level)  · A Tdap vaccine  may be recommended by your healthcare provider  · A group B strep test  is a test that is done to check for group B strep infection  Group B strep is a type of bacteria that may be found in the vagina or rectum  It can be passed to your baby during delivery if you have it  Your healthcare provider will take swab your vagina or rectum and send the sample to the lab for tests  · Fundal height  is a measurement of your uterus to check your baby's growth  This number is usually the same as the number of weeks that you have been pregnant  Your healthcare provider may also check your baby's position      · Your baby's heart rate  will be checked  When should I seek immediate care? · You develop a severe headache that does not go away  · You have new or increased vision changes, such as blurred or spotted vision  · You have new or increased swelling in your face or hands  · You have vaginal spotting or bleeding  · Your water broke or you feel warm water gushing or trickling from your vagina  When should I call my obstetrician? · You have more than 5 contractions in 1 hour  · You notice any changes in your baby's movements  · You have abdominal cramps, pressure, or tightening  · You have a change in vaginal discharge  · You have chills or a fever  · You have vaginal itching, burning, or pain  · You have yellow, green, white, or foul-smelling vaginal discharge  · You have pain or burning when you urinate, less urine than usual, or pink or bloody urine  · You have questions or concerns about your condition or care  CARE AGREEMENT:   You have the right to help plan your care  Learn about your health condition and how it may be treated  Discuss treatment options with your healthcare providers to decide what care you want to receive  You always have the right to refuse treatment  The above information is an  only  It is not intended as medical advice for individual conditions or treatments  Talk to your doctor, nurse or pharmacist before following any medical regimen to see if it is safe and effective for you  © Copyright [employer] 2022 Information is for End User's use only and may not be sold, redistributed or otherwise used for commercial purposes   All illustrations and images included in CareNotes® are the copyrighted property of [employer]  or [address] [place] [address] [employer]

## 2022-03-08 ENCOUNTER — HOSPITAL ENCOUNTER (INPATIENT)
Facility: HOSPITAL | Age: 32
LOS: 2 days | Discharge: HOME | End: 2022-03-10
Attending: OBSTETRICS & GYNECOLOGY | Admitting: OBSTETRICS & GYNECOLOGY
Payer: COMMERCIAL

## 2022-03-08 ENCOUNTER — ULTRASOUND (OUTPATIENT)
Dept: PERINATAL CARE | Facility: CLINIC | Age: 32
End: 2022-03-08
Payer: COMMERCIAL

## 2022-03-08 VITALS
TEMPERATURE: 99 F | HEIGHT: 62 IN | DIASTOLIC BLOOD PRESSURE: 70 MMHG | SYSTOLIC BLOOD PRESSURE: 120 MMHG | WEIGHT: 162.8 LBS | BODY MASS INDEX: 29.96 KG/M2 | HEART RATE: 91 BPM

## 2022-03-08 DIAGNOSIS — O24.414 INSULIN CONTROLLED GESTATIONAL DIABETES MELLITUS (GDM) IN THIRD TRIMESTER: ICD-10-CM

## 2022-03-08 DIAGNOSIS — R53.81 MALAISE AND FATIGUE: ICD-10-CM

## 2022-03-08 DIAGNOSIS — R53.83 MALAISE AND FATIGUE: ICD-10-CM

## 2022-03-08 DIAGNOSIS — Z3A.37 37 WEEKS GESTATION OF PREGNANCY: ICD-10-CM

## 2022-03-08 DIAGNOSIS — O24.414 INSULIN CONTROLLED GESTATIONAL DIABETES MELLITUS (GDM) IN THIRD TRIMESTER: Primary | ICD-10-CM

## 2022-03-08 PROBLEM — Z3A.36 36 WEEKS GESTATION OF PREGNANCY: Status: RESOLVED | Noted: 2022-03-01 | Resolved: 2022-03-08

## 2022-03-08 LAB
ABO GROUP BLD: NORMAL
ALBUMIN SERPL BCP-MCNC: 2.5 G/DL (ref 3.5–5)
ALP SERPL-CCNC: 167 U/L (ref 46–116)
ALT SERPL W P-5'-P-CCNC: 24 U/L (ref 12–78)
ANION GAP SERPL CALCULATED.3IONS-SCNC: 11 MMOL/L (ref 4–13)
AST SERPL W P-5'-P-CCNC: 18 U/L (ref 5–45)
BASOPHILS # BLD AUTO: 0.04 THOUSANDS/ΜL (ref 0–0.1)
BASOPHILS NFR BLD AUTO: 0 % (ref 0–1)
BILIRUB SERPL-MCNC: 0.22 MG/DL (ref 0.2–1)
BILIRUB UR QL STRIP: NEGATIVE
BLD GP AB SCN SERPL QL: NEGATIVE
BUN SERPL-MCNC: 7 MG/DL (ref 5–25)
CALCIUM ALBUM COR SERPL-MCNC: 9.9 MG/DL (ref 8.3–10.1)
CALCIUM SERPL-MCNC: 8.7 MG/DL (ref 8.3–10.1)
CHLORIDE SERPL-SCNC: 105 MMOL/L (ref 100–108)
CLARITY UR: CLEAR
CO2 SERPL-SCNC: 20 MMOL/L (ref 21–32)
COLOR UR: YELLOW
CREAT SERPL-MCNC: 0.43 MG/DL (ref 0.6–1.3)
CREAT UR-MCNC: 23 MG/DL
EOSINOPHIL # BLD AUTO: 0.08 THOUSAND/ΜL (ref 0–0.61)
EOSINOPHIL NFR BLD AUTO: 1 % (ref 0–6)
ERYTHROCYTE [DISTWIDTH] IN BLOOD BY AUTOMATED COUNT: 12.4 % (ref 11.6–15.1)
FLUAV RNA RESP QL NAA+PROBE: NEGATIVE
FLUBV RNA RESP QL NAA+PROBE: NEGATIVE
GFR SERPL CREATININE-BSD FRML MDRD: 137 ML/MIN/1.73SQ M
GLUCOSE SERPL-MCNC: 69 MG/DL (ref 65–140)
GLUCOSE SERPL-MCNC: 74 MG/DL (ref 65–140)
GLUCOSE SERPL-MCNC: 77 MG/DL (ref 65–140)
GLUCOSE SERPL-MCNC: 84 MG/DL (ref 65–140)
GLUCOSE SERPL-MCNC: 86 MG/DL (ref 65–140)
GLUCOSE SERPL-MCNC: 90 MG/DL (ref 65–140)
GLUCOSE SERPL-MCNC: 92 MG/DL (ref 65–140)
GLUCOSE UR STRIP-MCNC: NEGATIVE MG/DL
HCT VFR BLD AUTO: 36.4 % (ref 34.8–46.1)
HGB BLD-MCNC: 12.4 G/DL (ref 11.5–15.4)
HGB UR QL STRIP.AUTO: NEGATIVE
IMM GRANULOCYTES # BLD AUTO: 0.16 THOUSAND/UL (ref 0–0.2)
IMM GRANULOCYTES NFR BLD AUTO: 2 % (ref 0–2)
KETONES UR STRIP-MCNC: NEGATIVE MG/DL
LEUKOCYTE ESTERASE UR QL STRIP: NEGATIVE
LYMPHOCYTES # BLD AUTO: 1.76 THOUSANDS/ΜL (ref 0.6–4.47)
LYMPHOCYTES NFR BLD AUTO: 19 % (ref 14–44)
MCH RBC QN AUTO: 30.1 PG (ref 26.8–34.3)
MCHC RBC AUTO-ENTMCNC: 34.1 G/DL (ref 31.4–37.4)
MCV RBC AUTO: 88 FL (ref 82–98)
MONOCYTES # BLD AUTO: 0.42 THOUSAND/ΜL (ref 0.17–1.22)
MONOCYTES NFR BLD AUTO: 5 % (ref 4–12)
NEUTROPHILS # BLD AUTO: 6.69 THOUSANDS/ΜL (ref 1.85–7.62)
NEUTS SEG NFR BLD AUTO: 73 % (ref 43–75)
NITRITE UR QL STRIP: NEGATIVE
NRBC BLD AUTO-RTO: 0 /100 WBCS
PH UR STRIP.AUTO: 6.5 [PH]
PLATELET # BLD AUTO: 183 THOUSANDS/UL (ref 149–390)
PMV BLD AUTO: 9.9 FL (ref 8.9–12.7)
POTASSIUM SERPL-SCNC: 3.9 MMOL/L (ref 3.5–5.3)
PROT SERPL-MCNC: 6.5 G/DL (ref 6.4–8.2)
PROT UR STRIP-MCNC: NEGATIVE MG/DL
PROT UR-MCNC: 6 MG/DL
PROT/CREAT UR: 0.26 MG/G{CREAT} (ref 0–0.1)
RBC # BLD AUTO: 4.12 MILLION/UL (ref 3.81–5.12)
RH BLD: POSITIVE
RSV RNA RESP QL NAA+PROBE: NEGATIVE
SARS-COV-2 RNA RESP QL NAA+PROBE: NEGATIVE
SODIUM SERPL-SCNC: 136 MMOL/L (ref 136–145)
SP GR UR STRIP.AUTO: 1.01 (ref 1–1.03)
SPECIMEN EXPIRATION DATE: NORMAL
UROBILINOGEN UR QL STRIP.AUTO: 0.2 E.U./DL
WBC # BLD AUTO: 9.15 THOUSAND/UL (ref 4.31–10.16)

## 2022-03-08 PROCEDURE — 81003 URINALYSIS AUTO W/O SCOPE: CPT | Performed by: OBSTETRICS & GYNECOLOGY

## 2022-03-08 PROCEDURE — NC001 PR NO CHARGE: Performed by: OBSTETRICS & GYNECOLOGY

## 2022-03-08 PROCEDURE — 4A1HXCZ MONITORING OF PRODUCTS OF CONCEPTION, CARDIAC RATE, EXTERNAL APPROACH: ICD-10-PCS | Performed by: OBSTETRICS & GYNECOLOGY

## 2022-03-08 PROCEDURE — 86850 RBC ANTIBODY SCREEN: CPT | Performed by: OBSTETRICS & GYNECOLOGY

## 2022-03-08 PROCEDURE — 80053 COMPREHEN METABOLIC PANEL: CPT | Performed by: OBSTETRICS & GYNECOLOGY

## 2022-03-08 PROCEDURE — 84156 ASSAY OF PROTEIN URINE: CPT

## 2022-03-08 PROCEDURE — 3E033VJ INTRODUCTION OF OTHER HORMONE INTO PERIPHERAL VEIN, PERCUTANEOUS APPROACH: ICD-10-PCS | Performed by: OBSTETRICS & GYNECOLOGY

## 2022-03-08 PROCEDURE — 0241U HB NFCT DS VIR RESP RNA 4 TRGT: CPT | Performed by: OBSTETRICS & GYNECOLOGY

## 2022-03-08 PROCEDURE — 59025 FETAL NON-STRESS TEST: CPT | Performed by: OBSTETRICS & GYNECOLOGY

## 2022-03-08 PROCEDURE — 99214 OFFICE O/P EST MOD 30 MIN: CPT | Performed by: OBSTETRICS & GYNECOLOGY

## 2022-03-08 PROCEDURE — 86901 BLOOD TYPING SEROLOGIC RH(D): CPT | Performed by: OBSTETRICS & GYNECOLOGY

## 2022-03-08 PROCEDURE — 85025 COMPLETE CBC W/AUTO DIFF WBC: CPT | Performed by: OBSTETRICS & GYNECOLOGY

## 2022-03-08 PROCEDURE — 82570 ASSAY OF URINE CREATININE: CPT

## 2022-03-08 PROCEDURE — 86900 BLOOD TYPING SEROLOGIC ABO: CPT | Performed by: OBSTETRICS & GYNECOLOGY

## 2022-03-08 PROCEDURE — 99214 OFFICE O/P EST MOD 30 MIN: CPT

## 2022-03-08 PROCEDURE — 76815 OB US LIMITED FETUS(S): CPT | Performed by: OBSTETRICS & GYNECOLOGY

## 2022-03-08 PROCEDURE — 82948 REAGENT STRIP/BLOOD GLUCOSE: CPT

## 2022-03-08 RX ORDER — ONDANSETRON 2 MG/ML
4 INJECTION INTRAMUSCULAR; INTRAVENOUS EVERY 6 HOURS PRN
Status: DISCONTINUED | OUTPATIENT
Start: 2022-03-08 | End: 2022-03-09

## 2022-03-08 RX ORDER — INSULIN GLARGINE 100 [IU]/ML
12 INJECTION, SOLUTION SUBCUTANEOUS
Status: DISCONTINUED | OUTPATIENT
Start: 2022-03-08 | End: 2022-03-09

## 2022-03-08 RX ORDER — DIAPER,BRIEF,INFANT-TODD,DISP
EACH MISCELLANEOUS 4 TIMES DAILY PRN
Status: DISCONTINUED | OUTPATIENT
Start: 2022-03-08 | End: 2022-03-08

## 2022-03-08 RX ORDER — SODIUM CHLORIDE 9 MG/ML
125 INJECTION, SOLUTION INTRAVENOUS CONTINUOUS
Status: DISCONTINUED | OUTPATIENT
Start: 2022-03-08 | End: 2022-03-09

## 2022-03-08 RX ADMIN — SODIUM CHLORIDE 999 ML/HR: 0.9 INJECTION, SOLUTION INTRAVENOUS at 21:06

## 2022-03-08 RX ADMIN — Medication 25 MCG: at 16:21

## 2022-03-08 RX ADMIN — INSULIN GLARGINE 12 UNITS: 100 INJECTION, SOLUTION SUBCUTANEOUS at 22:38

## 2022-03-08 NOTE — LETTER
2022    Agustina Lafleur [address]    Patient: Ferdinand Stanley   YOB: 1990   Date of Visit: 3/8/2022   Gestational age 41w10d   [facility] of this communication: Priority: coming to L&D for evaluation (see tiger text)       Dear Rajat Tierney,    This patient was seen recently in our  office  The content of my evaluation today is in the ultrasound report under "OB Procedures" tab  Please don't hesitate to contact our office with any concerns or questions       Sincerely,      Elissa Travis MD  Attending Physician, [place]

## 2022-03-08 NOTE — PROGRESS NOTES
Patient requested to speak with a member from the diabetes team  Susan Gillis to speak with patient, bed 2, in our NST room  Patient stated her FBS yesterday was 118 and she contributes that to not sleeping well  This morning her FBS was 140  She is very "frustrated" with her FBS  Patient appeared pale in color  Asked patient if she was feeling well and she complained of poor appetite, diarrhea, sweating a lot, and chills at times  Patient "I just don't feel well " Discussed with Dr Ronel Gill who evaluated patient at bedside  Tympanic temp of 99 0  Dr Ronel Gill advised patient to be evaluated at [facility] L&D  Patient is scheduled for an induction of labor this Friday at 7 am  Explained to patient if she makes it to Friday, only inject half her nightly dose of Levemir

## 2022-03-08 NOTE — PROGRESS NOTES
Repeat Non-Stress Testing:    Pt verbalizes +FM  Pt denies ALL:               Leaking of fluid   Contractions   Vaginal bleeding   Decreased fetal movement    Patient has no questions or concerns  Dr Danuta Madrigal viewed NST prior to completion of appointment

## 2022-03-08 NOTE — PROGRESS NOTES
[address]: Ms Hyacinth Hsu was seen today for NST (found under the pregnancy episode) which I reviewed the RN assessment and agree, and JESSICA (see ultrasound report under OB procedures tab)  Review of Systems   Constitutional: Positive for chills  Has felt unwell since Sunday  Markedly elevated fastings since then  HENT: Positive for congestion  Respiratory: Negative for cough and shortness of breath  Cardiovascular: Negative for chest pain  Gastrointestinal: Positive for diarrhea  Negative for nausea  Genitourinary:        Cloudy urine   Skin: Positive for pallor  Neurological: Negative for headaches  Vitals:    03/08/22 0800 03/08/22 0818   BP: 120/70    BP Location: Right arm    Patient Position: Sitting    Cuff Size: Standard    Pulse: 91    Temp:  99 °F (37 2 °C)   TempSrc:  Tympanic   Weight: 73 8 kg (162 lb 12 8 oz)    Height: 5' 1 75" (1 568 m)      Physical Exam  Constitutional:       General: She is in acute distress  Appearance: Normal appearance  She is not ill-appearing  HENT:      Head: Normocephalic and atraumatic  Nose: No congestion or rhinorrhea  Eyes:      General: No scleral icterus  Right eye: No discharge  Left eye: No discharge  Extraocular Movements: Extraocular movements intact  Conjunctiva/sclera: Conjunctivae normal    Cardiovascular:      Rate and Rhythm: Regular rhythm  Heart sounds: Normal heart sounds  No murmur heard  No gallop  Pulmonary:      Effort: Pulmonary effort is normal  No respiratory distress  Breath sounds: Normal breath sounds  No wheezing or rales  Abdominal:      Comments: Gravid, soft nontender throughout   Musculoskeletal:      Cervical back: Normal range of motion  Skin:     Coloration: Skin is not jaundiced or pale  Findings: No erythema, lesion or rash  Neurological:      General: No focal deficit present        Mental Status: She is alert and oriented to person, place, and time  Comments: Reflexes 2+ patellar   Psychiatric:         Mood and Affect: Mood normal          Behavior: Behavior normal        Vitals:    03/08/22 0800 03/08/22 0818   BP: 120/70    BP Location: Right arm    Patient Position: Sitting    Cuff Size: Standard    Pulse: 91    Temp:  99 °F (37 2 °C)   TempSrc:  Tympanic   Weight: 73 8 kg (162 lb 12 8 oz)    Height: 5' 1 75" (1 568 m)      Problem List Items Addressed This Visit        Endocrine    Insulin controlled gestational diabetes mellitus (GDM) in third trimester - Primary     See note from Dr Levi Watson since last visit  IOL currently planned for Friday due to poor glycemic control  Sending to L&D now and advise consideration of delivery pending evaluation  Other    37 weeks gestation of pregnancy    Malaise and fatigue     Appears nontoxic but does not appear well  T99  Advise eval for UTI/pyelo also COVID  Sending to L&D now  Report sent via TT to [facility], charge RN and resident  Please don't hesitate to contact our office with any concerns or questions    Darvin Dempsey MD

## 2022-03-08 NOTE — H&P
H&P Exam - Obstetrics   Opal Bass 36 y o  female MRN: [id number]  Unit/Bed#: LD TRIAGE 3-01 Encounter: [id number]      ASSESSMENT:  36yo  at 37w6d weeks gestation who is being admitted for IOL due to poorly controlled A2GDM   EFW: 35% - 6lbs 4oz  VTX by transabdominal ultrasound     PLAN:    Pregnancy at 41w10d  Admit for IOL for poorly controlled A2GDM  Follow up CBC, RPR, Blood Type  Start induction based on initial SVE  Current insulin regime is 24 units of glargine, monitor sugar in labor  Will allow patient to eat before starting induction  GBS negative status   Analgesia and/or epidural at patient request  Anticipate     Discussed with Dr Ravi Sierra      This patient will be an INPATIENT  and I certify the anticipated length of stay is >2 Midnights  History of Present Illness     Chief Complaint: Induction of labor Headache    HPI:  Opal Bass is a 36 y o  Marcia Rase female with an TIKI of 3/23/2022, by Ultrasound at 37w6d weeks gestation who is being admitted for induction of labor from poorly controlled A2GDM  She presented to the triage with complaint of not feeling well  Her current pregnancy is complicated by Q6ZMI, and she's on 24 units of glargline HS  In the past few days, her fasting BG has darlene elevated around 110-140  She also complained of poor appetite, dysuria, lightheadedness, leg swelling, and headaches  She noticed leg swelling about a week ago  She denies subjective fevers, cough, or SOB  Aside from [address], she denies any other complications with this pregnancy  No prior significant medical history      She was worked up for pre-eclampsia and possible UTI in the triage  Her lab results were normal  The elevated FBG could be from uncontrolled A2GDM  She was scheduled for IOL this Friday  Contractions: occasionally  Loss of fluid: no  Vaginal bleeding: no  Fetal movement: yes    She is SLOGA patient       PREGNANCY COMPLICATIONS:   1) Q3QTV - on 24 units of glargine HS    OB History    Para Term  AB Living   1 0 0 0 0 0   SAB IAB Ectopic Multiple Live Births   0 0 0 0 0      # Outcome Date GA Lbr Ronnie/2nd Weight Sex Delivery Anes PTL Lv   1 Current               Obstetric Comments   Menarche 17       Baby complications/comments: none    Review of Systems   Constitutional: Negative for chills and fever  HENT: Negative for ear pain and sore throat  Eyes: Negative for pain and visual disturbance  Respiratory: Negative for cough and shortness of breath  Cardiovascular: Negative for chest pain and palpitations  Gastrointestinal: Negative for abdominal pain and vomiting  Genitourinary: Positive for dysuria  Negative for flank pain, frequency, hematuria and vaginal bleeding  Musculoskeletal: Positive for back pain  Negative for arthralgias  Skin: Negative for color change and rash  Neurological: Positive for light-headedness and headaches  Negative for seizures and syncope  All other systems reviewed and are negative        Historical Information   Past Medical History:   Diagnosis Date    GERD (gastroesophageal reflux disease)     Ovarian cyst     Renal calculi      Past Surgical History:   Procedure Laterality Date    LAPAROSCOPIC CHOLECYSTECTOMY      OVARIAN CYST REMOVAL      TOOTH EXTRACTION       Social History   Social History     Substance and Sexual Activity   Alcohol Use Not Currently    Comment: not since (+) pregnancy     Social History     Substance and Sexual Activity   Drug Use No     Social History     Tobacco Use   Smoking Status Never Smoker   Smokeless Tobacco Never Used     Family History:   Family History   Problem Relation Age of Onset    Psoriasis Father     Stroke Maternal Grandmother     Diabetes type II Paternal Grandfather     No Known Problems Mother     Heart disease Maternal Grandfather     Alcohol abuse Maternal Grandfather     Brain cancer Paternal Grandmother     Lupus Paternal Aunt     Breast cancer Neg Hx     Ovarian cancer Neg Hx     Cervical cancer Neg Hx     Colon cancer Neg Hx        Meds/Allergies      Medications Prior to Admission   Medication    Blood Glucose Monitoring Suppl (FreeStyle Freedom Lite) w/Device KIT    Insulin Pen Needle 31G X 5 MM MISC    Lancets (freestyle) lancets    Levemir FlexTouch 100 units/mL injection pen    Prenatal Vit-Fe Fumarate-FA (PRENATAL PO)    insulin glargine (Basaglar KwikPen) 100 units/mL injection pen      No Known Allergies    OBJECTIVE:    Vitals: Blood pressure 110/72, pulse 86, temperature (!) 97 2 °F (36 2 °C), temperature source Oral, resp  rate 18, height 5' 1" (1 549 m), weight 73 8 kg (162 lb 12 8 oz), last menstrual period 06/16/2021, not currently breastfeeding  Body mass index is 30 76 kg/m²  Physical Exam  Constitutional:       General: She is not in acute distress  Appearance: Normal appearance  HENT:      Head: Normocephalic and atraumatic  Eyes:      Pupils: Pupils are equal, round, and reactive to light  Cardiovascular:      Rate and Rhythm: Normal rate and regular rhythm  Pulses: Normal pulses  Heart sounds: Normal heart sounds  No murmur heard  Pulmonary:      Effort: Pulmonary effort is normal  No respiratory distress  Breath sounds: Normal breath sounds  No wheezing  Abdominal:      General: Bowel sounds are normal       Palpations: Abdomen is soft  Tenderness: There is abdominal tenderness (mild RUQ tenderness on plapation)  There is no right CVA tenderness, left CVA tenderness, guarding or rebound  Musculoskeletal:      Right lower leg: No edema  Left lower leg: No edema  Skin:     General: Skin is warm and dry  Neurological:      General: No focal deficit present  Mental Status: She is alert and oriented to person, place, and time     Psychiatric:         Mood and Affect: Mood normal          Behavior: Behavior normal             Fetal heart rate:   Baseline Rate: 120 bpm  Variability: Moderate 6-25 bpm  Accelerations: 15 x 15 or greater  Decelerations: None  FHR Category: Category I    [place]:   Contraction Frequency (minutes): occasional   Contraction Duration (seconds): 80  Contraction Quality: Mild    GBS: negative    Prenatal Labs: I have personally reviewed pertinent reports        Invasive Devices  Report    Peripheral Intravenous Line            Peripheral IV 03/08/22 Left Wrist <1 day                Virgil Olivier MD  3/8/2022  1:36 PM

## 2022-03-08 NOTE — LETTER
March 8, 2022     [address] [address]    Patient: Radha Sandoval   YOB: 1990   Date of Visit: 3/8/2022   She is on her way to L&D now per our tiger text       Dear Dr Umer Lay: Thank you for referring Evelyne Leiva to me for evaluation  Below are my notes for this consultation  If you have questions, please do not hesitate to call me  I look forward to following your patient along with you  Sincerely,        Gabe Aiken MD        CC: No Recipients  Gabe Aiken MD  3/8/2022  9:19 AM  Sign when Signing Visit  [address]: Ms Roxana Garcia was seen today for NST (found under the pregnancy episode) which I reviewed the RN assessment and agree, and JESSICA (see ultrasound report under OB procedures tab)  Review of Systems   Constitutional: Positive for chills  Has felt unwell since Sunday  Markedly elevated fastings since then  HENT: Positive for congestion  Respiratory: Negative for cough and shortness of breath  Cardiovascular: Negative for chest pain  Gastrointestinal: Positive for diarrhea  Negative for nausea  Genitourinary:        Cloudy urine   Skin: Positive for pallor  Neurological: Negative for headaches  Vitals:    03/08/22 0800 03/08/22 0818   BP: 120/70    BP Location: Right arm    Patient Position: Sitting    Cuff Size: Standard    Pulse: 91    Temp:  99 °F (37 2 °C)   TempSrc:  Tympanic   Weight: 73 8 kg (162 lb 12 8 oz)    Height: 5' 1 75" (1 568 m)      Physical Exam  Constitutional:       General: She is in acute distress  Appearance: Normal appearance  She is not ill-appearing  HENT:      Head: Normocephalic and atraumatic  Nose: No congestion or rhinorrhea  Eyes:      General: No scleral icterus  Right eye: No discharge  Left eye: No discharge  Extraocular Movements: Extraocular movements intact  Conjunctiva/sclera: Conjunctivae normal    Cardiovascular:      Rate and Rhythm: Regular rhythm  Heart sounds: Normal heart sounds  No murmur heard  No gallop  Pulmonary:      Effort: Pulmonary effort is normal  No respiratory distress  Breath sounds: Normal breath sounds  No wheezing or rales  Abdominal:      Comments: Gravid, soft nontender throughout   Musculoskeletal:      Cervical back: Normal range of motion  Skin:     Coloration: Skin is not jaundiced or pale  Findings: No erythema, lesion or rash  Neurological:      General: No focal deficit present  Mental Status: She is alert and oriented to person, place, and time  Comments: Reflexes 2+ patellar   Psychiatric:         Mood and Affect: Mood normal          Behavior: Behavior normal        Vitals:    03/08/22 0800 03/08/22 0818   BP: 120/70    BP Location: Right arm    Patient Position: Sitting    Cuff Size: Standard    Pulse: 91    Temp:  99 °F (37 2 °C)   TempSrc:  Tympanic   Weight: 73 8 kg (162 lb 12 8 oz)    Height: 5' 1 75" (1 568 m)      Problem List Items Addressed This Visit        Endocrine    Insulin controlled gestational diabetes mellitus (GDM) in third trimester - Primary     See note from Dr Joseph Stanley since last visit  IOL currently planned for Friday due to poor glycemic control  Sending to L&D now and advise consideration of delivery pending evaluation  Other    37 weeks gestation of pregnancy    Malaise and fatigue     Appears nontoxic but does not appear well  T99  Advise eval for UTI/pyelo also COVID  Sending to L&D now  Report sent via TT to benigno Farah RN and resident  Please don't hesitate to contact our office with any concerns or questions    Kobe Brooks MD

## 2022-03-08 NOTE — PLAN OF CARE
Problem: PAIN - ADULT  Goal: Verbalizes/displays adequate comfort level or baseline comfort level  Description: Interventions:  - Encourage patient to monitor pain and request assistance  - Assess pain using appropriate pain scale  - Administer analgesics based on type and severity of pain and evaluate response  - Implement non-pharmacological measures as appropriate and evaluate response  - Consider cultural and social influences on pain and pain management  - Notify physician/advanced practitioner if interventions unsuccessful or patient reports new pain  Outcome: Progressing     Problem: INFECTION - ADULT  Goal: Absence or prevention of progression during hospitalization  Description: INTERVENTIONS:  - Assess and monitor for signs and symptoms of infection  - Monitor lab/diagnostic results  - Monitor all insertion sites, i e  indwelling lines, tubes, and drains  - Monitor endotracheal if appropriate and nasal secretions for changes in amount and color  - [place] appropriate cooling/warming therapies per order  - Administer medications as ordered  - Instruct and encourage patient and family to use good hand hygiene technique  - Identify and instruct in appropriate isolation precautions for identified infection/condition  Outcome: Progressing  Goal: Absence of fever/infection during neutropenic period  Description: INTERVENTIONS:  - Monitor WBC    Outcome: Progressing     Problem: SAFETY ADULT  Goal: Patient will remain free of falls  Description: INTERVENTIONS:  - Educate patient/family on patient safety including physical limitations  - Instruct patient to call for assistance with activity   - Consult OT/PT to assist with strengthening/mobility   - Keep Call bell within reach  - Keep bed low and locked with side rails adjusted as appropriate  - Keep care items and personal belongings within reach  - Initiate and maintain comfort rounds  - Make Fall Risk Sign visible to staff  - Offer Toileting every  Hours, in advance of need  - Initiate/Maintain alarm  - Obtain necessary fall risk management equipment:   - Apply yellow socks and bracelet for high fall risk patients  - Consider moving patient to room near nurses station  Outcome: Progressing  Goal: Maintain or return to baseline ADL function  Description: INTERVENTIONS:  -  Assess patient's ability to carry out ADLs; assess patient's baseline for ADL function and identify physical deficits which impact ability to perform ADLs (bathing, care of mouth/teeth, toileting, grooming, dressing, etc )  - Assess/evaluate cause of self-care deficits   - Assess range of motion  - Assess patient's mobility; develop plan if impaired  - Assess patient's need for assistive devices and provide as appropriate  - Encourage maximum independence but intervene and supervise when necessary  - Involve family in performance of ADLs  - Assess for home care needs following discharge   - Consider OT consult to assist with ADL evaluation and planning for discharge  - Provide patient education as appropriate  Outcome: Progressing  Goal: Maintains/Returns to pre admission functional level  Description: INTERVENTIONS:  - Perform BMAT or MOVE assessment daily    - Set and communicate daily mobility goal to care team and patient/family/caregiver  - Collaborate with rehabilitation services on mobility goals if consulted  - Perform Range of Motion  times a day  - Reposition patient every  hours    - Dangle patient  times a day  - Stand patient  times a day  - Ambulate patient  times a day  - Out of bed to chair  times a day   - Out of bed for meals  times a day  - Out of bed for toileting  - Record patient progress and toleration of activity level   Outcome: Progressing     Problem: Knowledge Deficit  Goal: Patient/family/caregiver demonstrates understanding of disease process, treatment plan, medications, and discharge instructions  Description: Complete learning assessment and assess knowledge base   Interventions:  - Provide teaching at level of understanding  - Provide teaching via preferred learning methods  Outcome: Progressing  Goal: Verbalizes understanding of labor plan  Description: Assess patient/family/caregiver's baseline knowledge level and ability to understand information  Provide education via patient/family/caregiver's preferred learning method at appropriate level of understanding  1  Provide teaching at level of understanding  2  Provide teaching via preferred learning method(s)  Outcome: Progressing     Problem: DISCHARGE PLANNING  Goal: Discharge to home or other facility with appropriate resources  Description: INTERVENTIONS:  - Identify barriers to discharge w/patient and caregiver  - Arrange for needed discharge resources and transportation as appropriate  - Identify discharge learning needs (meds, wound care, etc )  - Arrange for interpretive services to assist at discharge as needed  - Refer to Case Management Department for coordinating discharge planning if the patient needs post-hospital services based on physician/advanced practitioner order or complex needs related to functional status, cognitive ability, or social support system  Outcome: Progressing     Problem: Labor & Delivery  Goal: Manages discomfort  Description: Assess and monitor for signs and symptoms of discomfort  Assess patient's pain level regularly and per hospital policy  Administer medications as ordered  Support use of nonpharmacological methods to help control pain such as distraction, imagery, relaxation, and application of heat and cold  Collaborate with interdisciplinary team and patient to determine appropriate pain management plan  1  Include patient in decisions related to comfort  2  Offer non-pharmacological pain management interventions  3  Report ineffective pain management to physician  Outcome: Progressing  Goal: Patient vital signs are stable  Description: 1   Assess vital signs - vaginal delivery    Outcome: Progressing

## 2022-03-08 NOTE — LETTER
NSMARICHUY sleeve cover sheet    Patient name: Kate Lord  : 1990  MRN: [id number]    TIKI: Estimated Date of Delivery: 3/23/22    Obstetrician: Alcides Churchill    Reason(s) for testing: GDM      Testing frequency:    __X_ 2x/wk  ___ 1x/wk  ___ Dopplers  ___ BPP?       Last growth scan: __________________________________________

## 2022-03-08 NOTE — ASSESSMENT & PLAN NOTE
See note from Dr Rina Sainz since last visit  IOL currently planned for Friday due to poor glycemic control  Sending to L&D now and advise consideration of delivery pending evaluation

## 2022-03-08 NOTE — PROGRESS NOTES
Triage Note - OB  Naga Hollins 36 y o  female MRN: [id number]  Unit/Bed#:  TRIAGE 3 Encounter: [id number]    OB TRIAGE NOTE  Naga Hollins  [id number]  3/8/2022  11:34 AM  LD TRIAGE 3/LD TRIAGE 3-*    ASSESS:  36 y o   37w6d with feeling unwell over the past few days  PLAN  #1  R/o Pre eclampsia:  · Patient complained of generally feeling unwell, lightheadedness, headache, and history of leg swelling  · On physical exam, observed RUQ tenderness on palpation, no pedal edema  · CBC/CMP WNL  · Urine P/C ratio is ***    #2  Dysuria:   · Non significant UA  · No flank pain    #***  Discharge instructions  · Patient instructed to call if experiencing worsening contractions, vaginal bleeding, loss of fluid or decreased fetal movement  · Will follow up with OBGYN on ***  D/w Dr Camille Neil  ______________    SUBJECTIVE    TIKI: Estimated Date of Delivery: 3/23/22    HPI Chronology:  36 y o  Stephanie Tolbert presents with complaint of not feeling well  Her current pregnancy is complicated by N4IVD on 24 units of glargline HS  In the past few days, her fasting BG has darlene elevated around 110-140  She also complained of poor appetite, dysuria,  lightheadedness, leg swelling, and headaches  She noticed leg swelling about a week ago  She denies subjective fevers, cough, or SOB  Aside from [address], she denies other complications with this pregnancy  No prior significant medical history  She was sent in from ultrasound today to be evaluated for not feeling well  Dr Amanda Morin examined her and sent her in for possible UTI or COVID work up  Patient denied any recent exposure to COVID   She is scheduled for IOL this Friday at 7am     Contractions: no  Leakage: no  Bleeding: no  Fetal Movement: yes  Pelvic pain: no    Vitals:   /73   Pulse 93   Temp (!) 97 2 °F (36 2 °C) (Oral)   Resp 18   Ht 5' 1" (1 549 m)   Wt 73 8 kg (162 lb 12 8 oz)   LMP 2021 (Exact Date)   BMI 30 76 kg/m²   Body mass index is 30 76 kg/m²  Review of Systems   Constitutional: Positive for appetite change (decreased)  Negative for chills and fever  HENT: Negative for ear pain and sore throat  Eyes: Negative for pain and visual disturbance  Respiratory: Negative for cough and shortness of breath  Cardiovascular: Positive for leg swelling  Negative for chest pain and palpitations  Gastrointestinal: Negative for abdominal pain and vomiting  Genitourinary: Negative for dysuria and hematuria  Musculoskeletal: Negative for arthralgias and back pain  Skin: Negative for color change and rash  Neurological: Positive for light-headedness and headaches  Negative for seizures and syncope  All other systems reviewed and are negative  Physical Exam  Vitals reviewed  Constitutional:       General: She is not in acute distress  Appearance: Normal appearance  HENT:      Head: Normocephalic and atraumatic  Eyes:      Pupils: Pupils are equal, round, and reactive to light  Cardiovascular:      Rate and Rhythm: Normal rate and regular rhythm  Pulses: Normal pulses  Heart sounds: Normal heart sounds  No murmur heard  Pulmonary:      Effort: Pulmonary effort is normal  No respiratory distress  Breath sounds: Normal breath sounds  No wheezing  Abdominal:      General: Bowel sounds are normal       Palpations: Abdomen is soft  Tenderness: There is abdominal tenderness (RUQ tenderness on palpation)  Musculoskeletal:         General: No tenderness  Right lower leg: No edema  Left lower leg: No edema  Skin:     General: Skin is warm and dry  Neurological:      General: No focal deficit present  Mental Status: She is alert and oriented to person, place, and time     Psychiatric:         Mood and Affect: Mood normal          Behavior: Behavior normal          FHT:     TOCO:        Labs:   Recent Results (from the past 24 hour(s))   Comprehensive metabolic panel Collection Time: 03/08/22 10:40 AM   Result Value Ref Range    Sodium 136 136 - 145 mmol/L    Potassium 3 9 3 5 - 5 3 mmol/L    Chloride 105 100 - 108 mmol/L    CO2 20 (L) 21 - 32 mmol/L    ANION GAP 11 4 - 13 mmol/L    BUN 7 5 - 25 mg/dL    Creatinine 0 43 (L) 0 60 - 1 30 mg/dL    Glucose 84 65 - 140 mg/dL    Calcium 8 7 8 3 - 10 1 mg/dL    Corrected Calcium 9 9 8 3 - 10 1 mg/dL    AST 18 5 - 45 U/L    ALT 24 12 - 78 U/L    Alkaline Phosphatase 167 (H) 46 - 116 U/L    Total Protein 6 5 6 4 - 8 2 g/dL    Albumin 2 5 (L) 3 5 - 5 0 g/dL    Total Bilirubin 0 22 0 20 - 1 00 mg/dL    eGFR 137 ml/min/1 73sq m   CBC and differential    Collection Time: 03/08/22 10:40 AM   Result Value Ref Range    WBC 9 15 4 31 - 10 16 Thousand/uL    RBC 4 12 3 81 - 5 12 Million/uL    Hemoglobin 12 4 11 5 - 15 4 g/dL    Hematocrit 36 4 34 8 - 46 1 %    MCV 88 82 - 98 fL    MCH 30 1 26 8 - 34 3 pg    MCHC 34 1 31 4 - 37 4 g/dL    RDW 12 4 11 6 - 15 1 %    MPV 9 9 8 9 - 12 7 fL    Platelets 554 613 - 868 Thousands/uL    nRBC 0 /100 WBCs    Neutrophils Relative 73 43 - 75 %    Immat GRANS % 2 0 - 2 %    Lymphocytes Relative 19 14 - 44 %    Monocytes Relative 5 4 - 12 %    Eosinophils Relative 1 0 - 6 %    Basophils Relative 0 0 - 1 %    Neutrophils Absolute 6 69 1 85 - 7 62 Thousands/µL    Immature Grans Absolute 0 16 0 00 - 0 20 Thousand/uL    Lymphocytes Absolute 1 76 0 60 - 4 47 Thousands/µL    Monocytes Absolute 0 42 0 17 - 1 22 Thousand/µL    Eosinophils Absolute 0 08 0 00 - 0 61 Thousand/µL    Basophils Absolute 0 04 0 00 - 0 10 Thousands/µL   COVID/FLU/RSV    Collection Time: 03/08/22 10:40 AM    Specimen: Nasopharyngeal Swab; Nares   Result Value Ref Range    SARS-CoV-2 Negative Negative    INFLUENZA A PCR Negative Negative    INFLUENZA B PCR Negative Negative    RSV PCR Negative Negative   UA w Reflex to Microscopic w Reflex to Culture    Collection Time: 03/08/22 10:41 AM    Specimen: Urine, Clean Catch   Result Value Ref Range Color, UA Yellow     Clarity, UA Clear     Specific [place], UA 1 010 1 003 - 1 030    pH, UA 6 5 4 5, 5 0, 5 5, 6 0, 6 5, 7 0, 7 5, 8 0    Leukocytes, UA Negative Negative    Nitrite, UA Negative Negative    Protein, UA Negative Negative mg/dl    Glucose, UA Negative Negative mg/dl    Ketones, UA Negative Negative mg/dl    Urobilinogen, UA 0 2 0 2, 1 0 E U /dl E U /dl    Bilirubin, UA Negative Negative    Blood, UA Negative Negative       Lab, Imaging and other studies: I have personally reviewed pertinent reports          [address] Aditya Lomas MD  3/8/2022  11:34 AM

## 2022-03-08 NOTE — OB LABOR/OXYTOCIN SAFETY PROGRESS
Labor Progress Note - Yue Omer 36 y o  female MRN: [id number]    Unit/Bed#:  TRIAGE 3-01 Encounter: [id number]       Contraction Frequency (minutes): occasional   Contraction Quality: Mild  Tachysystole: No   Cervical Dilation: Closed        Cervical Effacement: 50  Fetal Station: -3  Baseline Rate: 120 bpm  Fetal Heart Rate: 145 BPM  FHR Category: Category I               Vital Signs:   Vitals:    03/08/22 1250   BP: 110/72   Pulse:    Resp:    Temp:        Notes/comments: SVE as above  D/w patient starting with cytotec for ripening and rechecking after 3h to see if she is favorable for a carias balloon  She is amenable to the plan  Rupture work up negative x3   D/w Dr Lindsey Nassar MD 3/8/2022 2:00 PM

## 2022-03-09 ENCOUNTER — ANESTHESIA (INPATIENT)
Dept: ANESTHESIOLOGY | Facility: HOSPITAL | Age: 32
End: 2022-03-09
Payer: COMMERCIAL

## 2022-03-09 ENCOUNTER — ANESTHESIA EVENT (INPATIENT)
Dept: ANESTHESIOLOGY | Facility: HOSPITAL | Age: 32
End: 2022-03-09
Payer: COMMERCIAL

## 2022-03-09 LAB
BASE EXCESS BLDCOA CALC-SCNC: -8 MMOL/L (ref 3–11)
GLUCOSE SERPL-MCNC: 70 MG/DL (ref 65–140)
GLUCOSE SERPL-MCNC: 76 MG/DL (ref 65–140)
GLUCOSE SERPL-MCNC: 79 MG/DL (ref 65–140)
GLUCOSE SERPL-MCNC: 90 MG/DL (ref 65–140)
GLUCOSE SERPL-MCNC: 90 MG/DL (ref 65–140)
HCO3 BLDCOA-SCNC: 19.5 MMOL/L (ref 17.3–27.3)
O2 CT VFR BLDCOA CALC: 7.9 ML/DL
OXYHGB MFR BLDCOA: 36.3 %
PCO2 BLDCOA: 46.7 MM[HG] (ref 30–60)
PH BLDCOA: 7.24 [PH] (ref 7.23–7.43)
PO2 BLDCOA: 19 MM HG (ref 5–25)

## 2022-03-09 PROCEDURE — 0UQMXZZ REPAIR VULVA, EXTERNAL APPROACH: ICD-10-PCS | Performed by: OBSTETRICS & GYNECOLOGY

## 2022-03-09 PROCEDURE — 0HQ9XZZ REPAIR PERINEUM SKIN, EXTERNAL APPROACH: ICD-10-PCS | Performed by: OBSTETRICS & GYNECOLOGY

## 2022-03-09 PROCEDURE — 82805 BLOOD GASES W/O2 SATURATION: CPT | Performed by: OBSTETRICS & GYNECOLOGY

## 2022-03-09 PROCEDURE — 82948 REAGENT STRIP/BLOOD GLUCOSE: CPT

## 2022-03-09 PROCEDURE — 99024 POSTOP FOLLOW-UP VISIT: CPT | Performed by: STUDENT IN AN ORGANIZED HEALTH CARE EDUCATION/TRAINING PROGRAM

## 2022-03-09 PROCEDURE — 59400 OBSTETRICAL CARE: CPT | Performed by: OBSTETRICS & GYNECOLOGY

## 2022-03-09 RX ORDER — LIDOCAINE HYDROCHLORIDE AND EPINEPHRINE 15; 5 MG/ML; UG/ML
INJECTION, SOLUTION EPIDURAL
Status: COMPLETED | OUTPATIENT
Start: 2022-03-09 | End: 2022-03-09

## 2022-03-09 RX ORDER — ACETAMINOPHEN 325 MG/1
650 TABLET ORAL EVERY 4 HOURS PRN
Status: DISCONTINUED | OUTPATIENT
Start: 2022-03-09 | End: 2022-03-10 | Stop reason: HOSPADM

## 2022-03-09 RX ORDER — OXYTOCIN/RINGER'S LACTATE 30/500 ML
1-30 PLASTIC BAG, INJECTION (ML) INTRAVENOUS
Status: DISCONTINUED | OUTPATIENT
Start: 2022-03-09 | End: 2022-03-09

## 2022-03-09 RX ORDER — DIAPER,BRIEF,INFANT-TODD,DISP
1 EACH MISCELLANEOUS DAILY PRN
Status: DISCONTINUED | OUTPATIENT
Start: 2022-03-09 | End: 2022-03-10 | Stop reason: HOSPADM

## 2022-03-09 RX ORDER — IBUPROFEN 600 MG/1
600 TABLET ORAL EVERY 6 HOURS
Status: DISCONTINUED | OUTPATIENT
Start: 2022-03-09 | End: 2022-03-10 | Stop reason: HOSPADM

## 2022-03-09 RX ADMIN — SODIUM CHLORIDE 125 ML/HR: 0.9 INJECTION, SOLUTION INTRAVENOUS at 09:44

## 2022-03-09 RX ADMIN — LIDOCAINE HYDROCHLORIDE AND EPINEPHRINE 3 ML: 15; 5 INJECTION, SOLUTION EPIDURAL at 05:36

## 2022-03-09 RX ADMIN — SODIUM CHLORIDE 125 ML/HR: 0.9 INJECTION, SOLUTION INTRAVENOUS at 07:20

## 2022-03-09 RX ADMIN — SODIUM CHLORIDE 125 ML/HR: 0.9 INJECTION, SOLUTION INTRAVENOUS at 03:30

## 2022-03-09 RX ADMIN — ONDANSETRON 4 MG: 2 INJECTION INTRAMUSCULAR; INTRAVENOUS at 09:09

## 2022-03-09 RX ADMIN — IBUPROFEN 600 MG: 600 TABLET ORAL at 12:15

## 2022-03-09 RX ADMIN — SODIUM CHLORIDE 999 ML/HR: 0.9 INJECTION, SOLUTION INTRAVENOUS at 05:57

## 2022-03-09 RX ADMIN — Medication 2 MILLI-UNITS/MIN: at 01:35

## 2022-03-09 RX ADMIN — WITCH HAZEL 1 PAD: 500 SOLUTION RECTAL; TOPICAL at 12:15

## 2022-03-09 RX ADMIN — LIDOCAINE HYDROCHLORIDE AND EPINEPHRINE 2 ML: 15; 5 INJECTION, SOLUTION EPIDURAL at 05:37

## 2022-03-09 RX ADMIN — HYDROCORTISONE 1 APPLICATION: 1 CREAM TOPICAL at 12:15

## 2022-03-09 RX ADMIN — BENZOCAINE AND LEVOMENTHOL 1 APPLICATION: 200; 5 SPRAY TOPICAL at 12:15

## 2022-03-09 RX ADMIN — IBUPROFEN 600 MG: 600 TABLET ORAL at 20:00

## 2022-03-09 RX ADMIN — ROPIVACAINE HYDROCHLORIDE: 2 INJECTION, SOLUTION EPIDURAL; INFILTRATION at 05:44

## 2022-03-09 NOTE — OB LABOR/OXYTOCIN SAFETY PROGRESS
Oxytocin Safety Progress Check Note - Rashaad Ramos 36 y o  female MRN: [id number]    Unit/Bed#: -01 Encounter: [id number]    Dose (anival-units/min) Oxytocin: 6 anival-units/min (per Dr Jaiden De La Rosa)  Contraction Frequency (minutes): 2-3  Contraction Quality: Moderate  Tachysystole: No   Cervical Dilation: 5        Cervical Effacement: 80  Fetal Station: -2  Baseline Rate: 145 bpm  Fetal Heart Rate: 160 BPM  FHR Category: Category II               Vital Signs:   Vitals:    03/09/22 0549   BP:    Pulse: (!) 112   Resp:    Temp:    SpO2: 97%       Notes/comments:   Patient is comfortable with epidural   Fetal heart tracing notable for late decelerations  She spontaneously ruptured  Cervical exam is as above  Pitocin turned down to 6 from 8  Will continue to monitor        Chasidy Veronica MD 3/9/2022 6:02 AM

## 2022-03-09 NOTE — DISCHARGE INSTRUCTIONS
Self Care After Delivery   AMBULATORY CARE:   The postpartum period  is the period of time from delivery to about 6 weeks  During this time you may experience many physical and emotional changes  It is important to understand what is normal and when you need to call your healthcare provider  It is also important to know how to care for yourself during this time  Call your local emergency number (911 in the [address] [facility]) for any of the following:   · You see or hear things that are not there, or have thoughts of harming yourself or your baby  · You soak through 1 pad in 15 minutes, have blurry vision, clammy or pale skin, and feel faint  · You faint or lose consciousness  · You have trouble breathing  · You cough up blood  · Your  incision comes apart  Seek care immediately if:   · Your heart is beating faster than usual     · You have a bad headache or changes in your vision  · Your episiotomy or  incision is red, swollen, bleeding, or draining pus  · You have severe abdominal pain  Call your doctor or obstetrician if:   · Your leg is painful, red, and larger than usual     · You soak through 1 or more pads in an hour, or pass blood clots larger than a quarter from your vagina  · You have a fever  · You have new or worsening pain in your abdomen or vagina  · You continue to have depression 1 to 2 weeks after you deliver  · You have trouble sleeping  · You have foul-smelling discharge from your vagina  · You have pain or burning when you urinate  · You do not have a bowel movement for 3 days or more  · You have nausea or are vomiting  · You have hard lumps or red streaks over your breasts  · You have cracked nipples or bleed from your nipples  · You have questions or concerns about your condition or care  Physical changes:   The following are normal changes after you give birth:  · Pain in the area between your anus and vagina    · Breast pain    · Constipation or hemorrhoids    · Hot or cold flashes    · Vaginal bleeding or discharge    · Mild to moderate abdominal cramping    · Difficulty controlling bowel movements or urine    Emotional changes:  A drop in hormone levels after you deliver may cause changes in your emotions  You may feel irritable, sad, or anxious  You may cry easily or for no reason  You may also feel depressed  Depression that continues can be a sign of postpartum depression, a condition that can be treated  Treatment may include talk therapy, medicines, or both  Healthcare providers will ask how you are feeling and if you have any depression  These talks can happen during appointments for your medical care and for your baby's care, such as well child visits  Providers can help you find ways to care for yourself and your baby  Talk to your providers about the following:  · When emotional changes or depression started, and if it is getting worse over time    · Problems you are having with daily activities, sleep, or caring for your baby    · If anything makes you feel worse, or makes you feel better    · Feeling that you are not bonding with your baby the way you want    · Any problems your baby has with sleeping or feeding    · Your baby is fussy or cries a lot    · Support you have from friends, family, or others    Breast care for breastfeeding mothers: You may have sore breasts for 3 to 6 days after you give birth  This happens as your milk begins to fill your breasts  You may also have sore breasts if you do not breastfeed frequently  Do the following to care for your breasts:  · Apply a moist, warm, compress to your breast as directed  This may help soothe your breasts  Make sure the washcloth is not too hot before you apply it to your breast     · Nurse your baby or pump your milk frequently  This may prevent clogged milk ducts  Ask your healthcare provider how often to nurse or pump      · Massage your breasts as directed  This may help increase your milk flow  Gently rub your breasts in a circular motion before you breastfeed  You may need to gently squeeze your breast or nipple to help release milk  You can also use a breast pump to help release milk from your breast     · Wash your breasts with warm water only  Do not put soap on your nipples  Soap may cause your nipples to become dry  · Apply lanolin cream to your nipples as directed  Lanolin cream may add moisture to your skin and prevent nipple dryness  Always  wash off lanolin cream with warm water before you breastfeed  · Place pads in your bra  Your nipples may leak milk when you are not breastfeeding  You can place pads inside of your bra to help prevent leaking onto your clothing  Ask your healthcare provider where to purchase bra pads  · Get breastfeeding support if needed  Healthcare providers can answer questions about breastfeeding and provide you with support  Ask your healthcare provider who you can contact if you need breastfeeding support  Breast care for non-breastfeeding mothers:  Milk will fill your breasts even if you bottle feed your baby  Do the following to help stop your milk from filling your breasts and causing pain:  · Wear a bra with support at all times  A sports bra or a tight-fitting bra will help stop your milk from coming in  · Apply ice on each breast for 15 to 20 minutes every hour or as directed  Use an ice pack, or put crushed ice in a plastic bag  Cover it with a towel before you apply it to your breast  Ice helps your milk ducts shrink  · Keep your breasts away from warm water  Warm water will make it easier for milk to fill your breasts  Stand with your breasts away from warm water in the shower  · Limit how much you touch your breasts  This will prevent them from filling with milk  Perineum care: Your perineum is the area between your rectum and vagina   It is normal to have swelling and pain in this area after you give birth  If you had an episiotomy, your healthcare provider may give you special instructions  · Clean your perineum after you use the bathroom  This may prevent infection and help with healing  Use a spray bottle with warm water to clean your perineum  You may also gently spray warm water against your perineum when you urinate  Always wipe front to back  · Take a sitz bath as directed  A sitz bath may help relieve swelling and pain  Fill your bath tub or bucket with water up to your hips and sit in the water  Use cold water for 2 days after you deliver  Then use warm water  Ask your healthcare provider for more information about a sitz bath  · Apply ice packs for the first 24 hours or as directed  Use a plastic glove filled with ice or buy an ice pack  Wrap the ice pack or plastic glove in a small towel or wash cloth  Place the ice pack on your perineum for 20 minutes at a time  · Sit on a donut-shaped pillow  This may relieve pressure on your perineum when you sit  · Use wipes that contain medicine or take pills as directed  Your healthcare provider may tell you to use witch hazel pads  You can place witch hazel pads in the refrigerator before you apply them to your perineum  Your provider may also tell you to take NSAIDs  Ask him or her how often to take pills or use the wipes  · Do not go swimming or take tub baths for 4 to 6 weeks or as directed  This will help prevent an infection in your vagina or uterus  Bowel and bladder care: It may take 3 to 5 days to have a bowel movement after you deliver your baby  You can do the following to prevent or manage constipation, and get control of your bowel or bladder:  · Take stool softeners as directed  A stool softener is medicine that will make your bowel movements softer  This may prevent or relieve constipation  A stool softener may also make bowel movements less painful  · Drink plenty of liquids    Ask how much liquid to drink each day and which liquids are best for you  Liquids may help prevent constipation  · Eat foods high in fiber  Examples include fruits, vegetables, grains, beans, and lentils  Ask your healthcare provider how much fiber you need each day  Fiber may prevent constipation  · Do Kegel exercises as directed  Kegel exercises will help strengthen the muscles that control bowel movements and urination  Ask your healthcare provider for more information on Kegel exercises  · Apply cold compresses or medicine to hemorrhoids as directed  This may relieve swelling and pain  Your healthcare provider may tell you to apply ice or wipes that contain medicine to your hemorrhoids  He or she may also tell you to use a sitz bath  Ask your provider for more information on how to manage hemorrhoids  Nutrition:  Good nutrition is important in the postpartum period  It will help you return to a healthy weight, increase your energy levels, and prevent constipation  It will also help you get enough nutrients and calories if you are going to breastfeed your baby  · Eat a variety of healthy foods  Healthy foods include fruits, vegetables, whole-grain breads, low-fat dairy products, beans, lean meats, and fish  You may need 500 to 700 extra calories each day if you breastfeed your baby  You may also need extra protein  · Limit foods with added sugar and high amounts of fat  These foods are high in calories and low in healthy nutrients  Read food labels so you know how much sugar and fat is in the food you want to eat  · Drink 8 to 10 glasses of water per day  Water will help you make plenty of milk for your baby  It will also help prevent constipation  Drink a glass of water every time you breastfeed your baby  · Take vitamins as directed  Ask your healthcare provider what vitamins you need  · Limit caffeine and alcohol if you are breastfeeding    Caffeine and alcohol can get into your breast milk  Caffeine and alcohol can make your baby fussy  They can also interfere with your baby's sleep  Ask your healthcare provider if you can drink alcohol or caffeine  Rest and sleep: You may feel very tired in the postpartum period  Enough sleep will help you heal and give you energy to care for your baby  The following may help you get sleep and rest:  · Nap when your baby naps  Your baby may nap several times during the day  Get rest during this time  · Limit visitors  Many people may want to see you and your baby  Ask friends or family to visit on different days  This will give you time to rest     · Do not plan too much for one day  Put off household chores so that you have time to rest  Gradually do more each day  · Ask for help from family, friends, or neighbors  Ask them to help you with laundry, cleaning, or errands  Also ask someone to watch the baby while you take a nap or relax  Ask your partner to help with the care of your baby  Pump some of your breast milk so your partner can feed your baby during the night  Exercise after delivery:  Wait until your healthcare provider says it is okay to exercise  Exercise can help you lose weight, increase your energy levels, and manage your mood  It can also prevent constipation and blood clots  Start with gentle exercises such as walking  Do more as you have more energy  You may need to avoid abdominal exercises for 1 to 2 weeks after you deliver  Talk to your healthcare provider about an exercise plan that is right for you  Sexual activity after delivery:   · Do not have sex until your healthcare provider says it is okay  You may need to wait 4 to 6 weeks before you have sex  This may prevent infection and allow time to heal     · Your menstrual cycle may begin as soon as 3 weeks after you deliver  Your period may be delayed if you breastfeed your baby  You can become pregnant before you get your first postpartum period   Talk to your healthcare provider about birth control that is right for you  Some types of birth control are not safe during breastfeeding  For support and more information:  Join a support group for new mothers  Ask for help from family and friends with chores, errands, and care of your baby  · Office of Women's Health,  Department of Health and Human Services  [address], [address] [facility] [place] [address]  [address], [address] [facility] [place] [address]  Phone: [phone]  Web Address: www womenshealth gov    · [facility]partum [address] [facility] , [address]  [address] [facility] , [address] [facility]  Web Address: [employer]  [email or web address]  [email or web address]    Follow up with your doctor or obstetrician as directed: You will need to follow up within 2 to 6 weeks of delivery  Write down your questions so you remember to ask them at your visits  © Copyright [employer] 2022 Information is for End User's use only and may not be sold, redistributed or otherwise used for commercial purposes  All illustrations and images included in CareNotes® are the copyrighted property of [employer]  or [address] [place], [place] [address]rob   The above information is an  only  It is not intended as medical advice for individual conditions or treatments  Talk to your doctor, nurse or pharmacist before following any medical regimen to see if it is safe and effective for you

## 2022-03-09 NOTE — PLAN OF CARE
Problem: PAIN - ADULT  Goal: Verbalizes/displays adequate comfort level or baseline comfort level  Description: Interventions:  - Encourage patient to monitor pain and request assistance  - Assess pain using appropriate pain scale  - Administer analgesics based on type and severity of pain and evaluate response  - Implement non-pharmacological measures as appropriate and evaluate response  - Consider cultural and social influences on pain and pain management  - Notify physician/advanced practitioner if interventions unsuccessful or patient reports new pain  3/8/2022 1947 by Rosalie Hunt RN  Outcome: Progressing  3/8/2022 1947 by Rosalie Hunt RN  Outcome: Progressing     Problem: INFECTION - ADULT  Goal: Absence or prevention of progression during hospitalization  Description: INTERVENTIONS:  - Assess and monitor for signs and symptoms of infection  - Monitor lab/diagnostic results  - Monitor all insertion sites, i e  indwelling lines, tubes, and drains  - Monitor endotracheal if appropriate and nasal secretions for changes in amount and color  - [place] appropriate cooling/warming therapies per order  - Administer medications as ordered  - Instruct and encourage patient and family to use good hand hygiene technique  - Identify and instruct in appropriate isolation precautions for identified infection/condition  3/8/2022 1947 by Rosalie Hunt RN  Outcome: Progressing  3/8/2022 1947 by Rosalie Hunt RN  Outcome: Progressing  Goal: Absence of fever/infection during neutropenic period  Description: INTERVENTIONS:  - Monitor WBC    3/8/2022 1947 by Rosalie Hunt RN  Outcome: Progressing  3/8/2022 1947 by Rosalie Hunt RN  Outcome: Progressing     Problem: SAFETY ADULT  Goal: Patient will remain free of falls  Description: INTERVENTIONS:  - Educate patient/family on patient safety including physical limitations  - Instruct patient to call for assistance with activity   - Consult OT/PT to assist with strengthening/mobility   - Keep Call bell within reach  - Keep bed low and locked with side rails adjusted as appropriate  - Keep care items and personal belongings within reach  - Initiate and maintain comfort rounds  - Make Fall Risk Sign visible to staff  - Offer Toileting every Hours, in advance of need  - Initiate/Maintain alarm  - Obtain necessary fall risk management equipment:   - Apply yellow socks and bracelet for high fall risk patients  - Consider moving patient to room near nurses station  3/8/2022 1947 by Valerie Hansen RN  Outcome: Progressing  3/8/2022 1947 by Valerie Hansen RN  Outcome: Progressing  Goal: Maintain or return to baseline ADL function  Description: INTERVENTIONS:  -  Assess patient's ability to carry out ADLs; assess patient's baseline for ADL function and identify physical deficits which impact ability to perform ADLs (bathing, care of mouth/teeth, toileting, grooming, dressing, etc )  - Assess/evaluate cause of self-care deficits   - Assess range of motion  - Assess patient's mobility; develop plan if impaired  - Assess patient's need for assistive devices and provide as appropriate  - Encourage maximum independence but intervene and supervise when necessary  - Involve family in performance of ADLs  - Assess for home care needs following discharge   - Consider OT consult to assist with ADL evaluation and planning for discharge  - Provide patient education as appropriate  3/8/2022 1947 by Valerie Hansen RN  Outcome: Progressing  3/8/2022 1947 by Valerie Hansen RN  Outcome: Progressing  Goal: Maintains/Returns to pre admission functional level  Description: INTERVENTIONS:  - Perform BMAT or MOVE assessment daily    - Set and communicate daily mobility goal to care team and patient/family/caregiver  - Collaborate with rehabilitation services on mobility goals if consulted  - Perform Range of Motion  times a day  - Reposition patient every hours    - Dangle patient  times a day  - Stand patient times a day  - Ambulate patient times a day  - Out of bed to chair times a day   - Out of bed for meals times a day  - Out of bed for toileting  - Record patient progress and toleration of activity level   3/8/2022 1947 by Fredrick Hernandez RN  Outcome: Progressing  3/8/2022 1947 by Fredrick Hernandez RN  Outcome: Progressing     Problem: Knowledge Deficit  Goal: Patient/family/caregiver demonstrates understanding of disease process, treatment plan, medications, and discharge instructions  Description: Complete learning assessment and assess knowledge base  Interventions:  - Provide teaching at level of understanding  - Provide teaching via preferred learning methods  3/8/2022 1947 by Fredrick Hernandez RN  Outcome: Progressing  3/8/2022 1947 by Fredrick Hernandez RN  Outcome: Progressing  Goal: Verbalizes understanding of labor plan  Description: Assess patient/family/caregiver's baseline knowledge level and ability to understand information  Provide education via patient/family/caregiver's preferred learning method at appropriate level of understanding  1  Provide teaching at level of understanding  2  Provide teaching via preferred learning method(s)    3/8/2022 1947 by Fredrick Hernandez RN  Outcome: Progressing  3/8/2022 1947 by Fredrick Hernandez RN  Outcome: Progressing     Problem: DISCHARGE PLANNING  Goal: Discharge to home or other facility with appropriate resources  Description: INTERVENTIONS:  - Identify barriers to discharge w/patient and caregiver  - Arrange for needed discharge resources and transportation as appropriate  - Identify discharge learning needs (meds, wound care, etc )  - Arrange for interpretive services to assist at discharge as needed  - Refer to Case Management Department for coordinating discharge planning if the patient needs post-hospital services based on physician/advanced practitioner order or complex needs related to functional status, cognitive ability, or social support system  3/8/2022 1947 by Alejandro aFust RN  Outcome: Progressing  3/8/2022 1947 by Alejandro Faust RN  Outcome: Progressing     Problem: Labor & Delivery  Goal: Manages discomfort  Description: Assess and monitor for signs and symptoms of discomfort  Assess patient's pain level regularly and per hospital policy  Administer medications as ordered  Support use of nonpharmacological methods to help control pain such as distraction, imagery, relaxation, and application of heat and cold  Collaborate with interdisciplinary team and patient to determine appropriate pain management plan  1  Include patient in decisions related to comfort  2  Offer non-pharmacological pain management interventions  3  Report ineffective pain management to physician  3/8/2022 1947 by Alejandro Faust RN  Outcome: Progressing  3/8/2022 1947 by Alejandro Faust RN  Outcome: Progressing  Goal: Patient vital signs are stable  Description: 1  Assess vital signs - vaginal delivery    3/8/2022 1947 by Alejandro Faust RN  Outcome: Progressing  3/8/2022 1947 by Alejandro Faust RN  Outcome: Progressing     Problem: BIRTH - VAGINAL/ SECTION  Goal: Fetal and maternal status remain reassuring during the birth process  Description: INTERVENTIONS:  - Monitor vital signs  - Monitor fetal heart rate  - Monitor uterine activity  - Monitor labor progression (vaginal delivery)  - DVT prophylaxis  - Antibiotic prophylaxis  Outcome: Progressing  Goal: Emotionally satisfying birthing experience for mother/fetus  Description: Interventions:  - Assess, plan, implement and evaluate the nursing care given to the patient in labor  - Advocate the philosophy that each childbirth experience is a unique experience and support the family's chosen level of involvement and control during the labor process   - Actively participate in both the patient's and family's teaching of the birth process  - Consider cultural, Restoration and age-specific factors and plan care for the patient in labor  Outcome: Progressing

## 2022-03-09 NOTE — OB LABOR/OXYTOCIN SAFETY PROGRESS
Labor Progress Note - Grace Otero 36 y o  female MRN: [id number]    Unit/Bed#: -01 Encounter: [id number]       Contraction Frequency (minutes): 1-3 5  Contraction Quality: Mild  Tachysystole: No   Cervical Dilation: 1        Cervical Effacement: 50  Fetal Station: -3  Baseline Rate: 125 bpm  Fetal Heart Rate: 140 BPM  FHR Category: Category I               Vital Signs:   Vitals:    03/08/22 2134   BP: 101/62   Pulse: 82   Resp:    Temp:    SpO2:        Notes/comments:     FB in place  FHT category 1  [place] q1-3m  Continue monitoring  Plan for SVE s/p FB dislodged      Kelly Sanchez MD 3/8/2022 10:47 PM

## 2022-03-09 NOTE — PLAN OF CARE
Problem: PAIN - ADULT  Goal: Verbalizes/displays adequate comfort level or baseline comfort level  Description: Interventions:  - Encourage patient to monitor pain and request assistance  - Assess pain using appropriate pain scale  - Administer analgesics based on type and severity of pain and evaluate response  - Implement non-pharmacological measures as appropriate and evaluate response  - Consider cultural and social influences on pain and pain management  - Notify physician/advanced practitioner if interventions unsuccessful or patient reports new pain  Outcome: Progressing     Problem: INFECTION - ADULT  Goal: Absence or prevention of progression during hospitalization  Description: INTERVENTIONS:  - Assess and monitor for signs and symptoms of infection  - Monitor lab/diagnostic results  - Monitor all insertion sites, i e  indwelling lines, tubes, and drains  - Monitor endotracheal if appropriate and nasal secretions for changes in amount and color  - [place] appropriate cooling/warming therapies per order  - Administer medications as ordered  - Instruct and encourage patient and family to use good hand hygiene technique  - Identify and instruct in appropriate isolation precautions for identified infection/condition  Outcome: Progressing  Goal: Absence of fever/infection during neutropenic period  Description: INTERVENTIONS:  - Monitor WBC    Outcome: Progressing     Problem: SAFETY ADULT  Goal: Patient will remain free of falls  Description: INTERVENTIONS:  - Educate patient/family on patient safety including physical limitations  - Instruct patient to call for assistance with activity   - Consult OT/PT to assist with strengthening/mobility   - Keep Call bell within reach  - Keep bed low and locked with side rails adjusted as appropriate  - Keep care items and personal belongings within reach  - Initiate and maintain comfort rounds  Problem: Knowledge Deficit  Goal: Patient/family/caregiver demonstrates understanding of disease process, treatment plan, medications, and discharge instructions  Description: Complete learning assessment and assess knowledge base  Interventions:  - Provide teaching at level of understanding  - Provide teaching via preferred learning methods  Outcome: Progressing  Goal: Verbalizes understanding of labor plan  Description: Assess patient/family/caregiver's baseline knowledge level and ability to understand information  Provide education via patient/family/caregiver's preferred learning method at appropriate level of understanding  1  Provide teaching at level of understanding  2  Provide teaching via preferred learning method(s)  Outcome: Progressing     Problem: DISCHARGE PLANNING  Goal: Discharge to home or other facility with appropriate resources  Description: INTERVENTIONS:  - Identify barriers to discharge w/patient and caregiver  - Arrange for needed discharge resources and transportation as appropriate  - Identify discharge learning needs (meds, wound care, etc )  - Arrange for interpretive services to assist at discharge as needed  - Refer to Case Management Department for coordinating discharge planning if the patient needs post-hospital services based on physician/advanced practitioner order or complex needs related to functional status, cognitive ability, or social support system  Outcome: Progressing     Problem: Labor & Delivery  Goal: Manages discomfort  Description: Assess and monitor for signs and symptoms of discomfort  Assess patient's pain level regularly and per hospital policy  Administer medications as ordered  Support use of nonpharmacological methods to help control pain such as distraction, imagery, relaxation, and application of heat and cold  Collaborate with interdisciplinary team and patient to determine appropriate pain management plan  1  Include patient in decisions related to comfort    2  Offer non-pharmacological pain management interventions  3  Report ineffective pain management to physician  Outcome: Progressing  Goal: Patient vital signs are stable  Description: 1  Assess vital signs - vaginal delivery    Outcome: Progressing     Problem: BIRTH - VAGINAL/ SECTION  Goal: Fetal and maternal status remain reassuring during the birth process  Description: INTERVENTIONS:  - Monitor vital signs  - Monitor fetal heart rate  - Monitor uterine activity  - Monitor labor progression (vaginal delivery)  - DVT prophylaxis  - Antibiotic prophylaxis  Outcome: Progressing  Goal: Emotionally satisfying birthing experience for mother/fetus  Description: Interventions:  - Assess, plan, implement and evaluate the nursing care given to the patient in labor  - Advocate the philosophy that each childbirth experience is a unique experience and support the family's chosen level of involvement and control during the labor process   - Actively participate in both the patient's and family's teaching of the birth process  - Consider cultural, Faith and age-specific factors and plan care for the patient in labor  Outcome: Progressing     Outcome: Progressing  Goal: Maintain or return to baseline ADL function  Description: INTERVENTIONS:  -  Assess patient's ability to carry out ADLs; assess patient's baseline for ADL function and identify physical deficits which impact ability to perform ADLs (bathing, care of mouth/teeth, toileting, grooming, dressing, etc )  - Assess/evaluate cause of self-care deficits   - Assess range of motion  - Assess patient's mobility; develop plan if impaired  - Assess patient's need for assistive devices and provide as appropriate  - Encourage maximum independence but intervene and supervise when necessary  - Involve family in performance of ADLs  - Assess for home care needs following discharge   - Consider OT consult to assist with ADL evaluation and planning for discharge  - Provide patient education as appropriate  Outcome: Progressing

## 2022-03-09 NOTE — QUICK NOTE
Vitals:    03/08/22 1820   BP: 111/66   Pulse: 83   Resp:    Temp:    SpO2:      PROCEDURE:  CARIAS BALLOON PLACEMENT    A 24F carias with a 30cc balloon was selected, SVE was performed and cervix was located, carias was introduced over sterile gloved hands  Balloon advanced through cervix beyond the internal cervical os  A small amount amount of sterile saline solution was instilled in the balloon to confirm placement  Placement was confirmed to be beyond the internal cervical os  A total of 60cc of sterile saline solution was placed into the balloon  Pt tolerated well  Instructions left with RN to place carias to gravity with a 1L bag of IV fluid  Notify MD when carias dislodged      Archana Franklin MD

## 2022-03-09 NOTE — ANESTHESIA POSTPROCEDURE EVALUATION
Post-Op Assessment Note    CV Status:  Stable  Pain Score: 0    Pain management: adequate     Mental Status:  Alert and awake   Hydration Status:  Euvolemic   PONV Controlled:  Controlled   Airway Patency:  Patent      Post Op Vitals Reviewed: Yes      Staff: CRNA     Post-op block assessment: catheter intact and no complications      No complications documented      /59 (03/09/22 1330)    Temp      Pulse 96 (03/09/22 1330)   Resp      SpO2

## 2022-03-09 NOTE — ANESTHESIA PROCEDURE NOTES
Epidural Block    Patient location during procedure: OB  Start time: 3/9/2022 5:35 AM  Reason for block: procedure for pain, at surgeon's request, post-op pain management and primary anesthetic  Staffing  Performed: CRNA   Preanesthetic Checklist  Completed: patient identified, IV checked, site marked, risks and benefits discussed, surgical consent, monitors and equipment checked, pre-op evaluation and timeout performed  Epidural  Patient position: sitting  Prep: ChloraPrep  Patient monitoring: heart rate, continuous pulse ox and frequent blood pressure checks  Approach: midline  Location: lumbar  Injection technique: DAVE air  Needle  Needle type: Tuohy   Needle gauge: 18 G  Catheter type: side hole  Catheter size: 18 G  Catheter at skin depth: 13 cm  Test dose: negativelidocaine 1 5% with epinephrine 1:200,000 test dose, 3 mL  Assessment  Sensory level: T4  Number of attempts: 1negative aspiration for CSF, negative aspiration for heme and no paresthesia on injection  patient tolerated the procedure well with no immediate complications  Additional Notes  Placed by YESSICA Swanson

## 2022-03-09 NOTE — OB LABOR/OXYTOCIN SAFETY PROGRESS
Oxytocin Safety Progress Check Note - Violeta Anton 36 y o  female MRN: [id number]    Unit/Bed#: -01 Encounter: [id number]    Dose (anival-units/min) Oxytocin: 6 anival-units/min (per Dr Mendoza Session)  Contraction Frequency (minutes): 2-4  Contraction Quality: Moderate  Tachysystole: No   Cervical Dilation: 5        Cervical Effacement: 80  Fetal Station: -2  Baseline Rate: 135 bpm  Fetal Heart Rate: 137 BPM  FHR Category: Category II               Vital Signs:   Vitals:    03/09/22 0742   BP: 107/59   Pulse: (!) 106   Resp:    Temp:    SpO2:      Vitals:    03/09/22 0706 03/09/22 0722 03/09/22 0735 03/09/22 0742   BP: 122/68 91/53 (!) 86/48 107/59   BP Location:       Pulse: (!) 110 105 104 (!) 106   Resp: 17      Temp: 97 8 °F (36 6 °C)      TempSrc: Oral      SpO2:       Weight:       Height:         Notes/comments:   Hypotensive episodes  Repositioning, fluid bolus, continues to have late decelerations after epidural placement    Plan to decrease oxytocin from 6 down to 4 mu/min     Now with accelerations, moderate variability, recovered      Shawna Wan MD 3/9/2022 7:46 AM

## 2022-03-09 NOTE — OB LABOR/OXYTOCIN SAFETY PROGRESS
Oxytocin Safety Progress Check Note - Grace Armenta 36 y o  female MRN: [id number]    Unit/Bed#: -01 Encounter: [id number]    Dose (anival-units/min) Oxytocin: 4 anival-units/min  Contraction Frequency (minutes): 3-3 5  Contraction Quality: Moderate  Tachysystole: No   Cervical Dilation: 10  Dilation Complete Date: 03/09/22  Dilation Complete Time: 0935  Cervical Effacement: 100  Fetal Station: 2  Baseline Rate: 140 bpm  Fetal Heart Rate: 141 BPM  FHR Category: Category II               Vital Signs: wnl  Appears well  Vitals:    03/09/22 0946   BP: 116/63   Pulse: (!) 108   Resp:    Temp:    SpO2:        Notes/comments: Appears well  FHT cat 1  Having LUQ pain w/ left CVA tenderness  U/a negative yesterday  Pain is tolerable   Will start pushing soon       Fidencio Swift MD 3/9/2022 9:52 AM

## 2022-03-09 NOTE — OB LABOR/OXYTOCIN SAFETY PROGRESS
Oxytocin Safety Progress Check Note - Yenny Grimaldo 36 y o  female MRN: [id number]    Unit/Bed#: -01 Encounter: [id number]    Dose (anival-units/min) Oxytocin: 8 anival-units/min  Contraction Frequency (minutes): 1 5-4  Contraction Quality: Moderate  Tachysystole: No   Cervical Dilation: 5        Cervical Effacement: 70  Fetal Station: -2  Baseline Rate: 140 bpm  Fetal Heart Rate: 131 BPM  FHR Category: Category II               Vital Signs:   Vitals:    03/09/22 0446   BP: 116/63   Pulse: 93   Resp:    Temp:    SpO2:        Notes/comments:   Pt is comfortable  FHT not concerning for acidemia at this time  Cervical exam as above  Will continue to monitor      Jonna Duncan MD 3/9/2022 5:11 AM

## 2022-03-09 NOTE — ANESTHESIA PREPROCEDURE EVALUATION
Procedure:  LABOR ANALGESIA    Relevant Problems   GYN   (+) 37 weeks gestation of pregnancy   (+) Supervision of normal first pregnancy      Other   (+) Insulin controlled gestational diabetes mellitus (GDM) in third trimester        Physical Exam    Airway    Mallampati score: II  TM Distance: >3 FB  Neck ROM: full     Dental       Cardiovascular      Pulmonary      Other Findings        Anesthesia Plan  ASA Score- 2     Anesthesia Type- epidural with ASA Monitors  Additional Monitors:   Airway Plan:           Plan Factors-    Chart reviewed  Induction-     Postoperative Plan-     Informed Consent- Anesthetic plan and risks discussed with patient  I personally reviewed this patient with the CRNA  Discussed and agreed on the Anesthesia Plan with the CRNA  Oskar Orosco

## 2022-03-09 NOTE — DISCHARGE SUMMARY
Discharge Summary - Emiliano Zhang 36 y o  female MRN: [id number]    Unit/Bed#: -01 Encounter: [id number]    Admission Date: 3/8/2022     Discharge Date: 3/10/2022    Admitting Diagnosis:   Patient Active Problem List   Diagnosis    Supervision of normal first pregnancy    Family history of cystic fibrosis    Insulin controlled gestational diabetes mellitus (GDM) in third trimester    BMI 28 0-28 9,adult    Prenatal care in third trimester    Hyperglycemia during pregnancy     (spontaneous vaginal delivery)    Malaise and fatigue       Discharge Diagnosis:   Same, delivered    Procedures:   spontaneous vaginal delivery    Admitting Attending: Dr Luis Taylor  Delivery Attending: Dr Johnathon Serra MD  Discharge Attending: Dr Terrance Aiken:     Emiliano Zhang is a 36 y o  [facility] who was admitted at 38w0d for IOL for poorly controlled A2GDM, not feeling well at triage  GDM was being managed on 24 units of glargline HS  Pt was admitted, cervical ripening w/ cytotec, and induced w/ carias and oxytocin  She delivered a viable female  on 3/9/2022 at 1053  Weight 6lbs 4 4oz via spontaneous vaginal delivery  Apgars were 9 (1 min) and 9 (5 min)   was transferred to  nursery  Patient tolerated the procedure well and was transferred to recovery in stable condition  Her post-partum course was uncomplicated  Her post-partum pain was well controlled with oral analgesics  The patient's post partum course was unremarkable  On day of discharge, she was ambulating and able to reasonably perform all ADLs  She was voiding and had appropriate bowel function  Pain was well controlled  She was discharged home on postpartum day #2 without complications  Patient was instructed to follow up with her OB as an outpatient and was given appropriate warnings to call doctor or provider if she develops signs of infection or uncontrolled pain      On day of discharge she was ambulating, voiding spontaneously, tolerating oral intake and hemodynamically stable  Mom's blood type is A positive and  Rhogam was not given  Disposition: Home    Planned Readmission: No    Discharge Medications:   Prenatal vitamin daily for 6 months or the duration of nursing, whichever is longer  Motrin 600 mg orally every 6 hours as needed for pain  Tylenol (over the counter) per bottle directions as needed for pain  Hydrocortisone cream 1% (over the counter) applied 1-2x daily to perineum as needed  Witch hazel pads for perineal discomfort as needed      Discharge instructions :   -Do not place anything (no partner, tampons or douche) in your vagina for 6 weeks  -You may walk for exercise for the first 6 weeks then gradually return to your usual activities    -Please do not drive for 1 week if you have no stitches and for 2 weeks if you have stitches or underwent a  delivery     -You may take baths or shower per your preference    -Please look at your bust (breasts) in the mirror daily and call your doctor for redness or tenderness or increased warmth  - If you have had a  section please look at your incision daily as well and call provider for increasing redness or steady drainage from the incision    -Please call your doctor's office if temperature > 100 4*F or 38* C, worsening pain or a foul discharge      Follow Up:  - Follow up in 3-6 weeks for postpartum visit    Nathaly Rodas MD   OB/Gyn PGY-1

## 2022-03-09 NOTE — L&D DELIVERY NOTE
DELIVERY NOTE  Raynn Nelson 36 y o  female MRN: [id number]  Unit/Bed#: -01 Encounter: [id number]      Obstetrician:  Dr Veda Mcclellan  Assistant: Dr Laura Mcdowell   Pre-Delivery Diagnosis:   3  35 yo  @ 38w0d Induction for A2GDM    Post-Delivery Diagnosis:   1  S/p   2  Repair of first degree laceration  Procedure:   , repair of first degree laceration   Indications for instrumental delivery: none  Quatitative Blood Loss: 15LH  Complications:  none  Specimens: Cord, blood, gasses  Description of Delivery: Patient delivered a viable female  Apgars 9 @ 1 min and 9 @ 5 min    The patient was found to be complete and began pushing  When delivery was imminent, the patient was placed in dorsal lithotomy position, prepped and draped in the usual sterile fahsing for a vaginal delivery  The head delivered spontaneously over an intact perineum  There was  a nuchal cord which was  reduced  With the assistance of maternal expulsive efforts and downward traction of fetal head, the anterior shoulder was delivered  The same manner was applied for the posterior shoulder but with upward traction  After delivery of the , the umbilical cord was doubly clamped and cut and the  was passed off to  staff for routine care  Umbilical cord blood and umbilical artery and venous gases were collected  Placenta was delivered with fundal massage and gentle traction on the cord  Placenta delivered intact with a central 3-vessel cord  Active management of the third stage of labor was undertaken with IV pitocin  Bleeding was noted to be under control  Inspection of the perineum, vagina, labia, and urethra revealed 3 first degree lacerations which was then repaired in standard fashion with 3-0 Vicryl rapid  The lacerations showed good tissue reapproximation and hemostasis  Mother and baby are currently recovering nicely in stable condition    All sponge, instrument and needle counts were correct

## 2022-03-09 NOTE — OB LABOR/OXYTOCIN SAFETY PROGRESS
Labor Progress Note - Chel Ghoshs 36 y o  female MRN: [id number]    Unit/Bed#: -01 Encounter: [id number]       Contraction Frequency (minutes): 1-3  Contraction Quality: Mild  Tachysystole: No   Cervical Dilation: 5        Cervical Effacement: 70  Fetal Station: -2  Baseline Rate: 130 bpm  Fetal Heart Rate: 149 BPM  FHR Category: Category I               Vital Signs:   Vitals:    03/08/22 2134   BP: 101/62   Pulse: 82   Resp:    Temp:    SpO2:        Notes/comments:   SVE 5/70/-2 s/p FB  Plan for pitocin for continued induction management  Dr Rashaad Hebert aware          Roni Panchal MD 3/9/2022 1:07 AM

## 2022-03-10 VITALS
WEIGHT: 162.8 LBS | SYSTOLIC BLOOD PRESSURE: 95 MMHG | HEART RATE: 91 BPM | OXYGEN SATURATION: 100 % | HEIGHT: 61 IN | BODY MASS INDEX: 30.73 KG/M2 | RESPIRATION RATE: 18 BRPM | TEMPERATURE: 97.8 F | DIASTOLIC BLOOD PRESSURE: 54 MMHG

## 2022-03-10 PROCEDURE — 99024 POSTOP FOLLOW-UP VISIT: CPT | Performed by: STUDENT IN AN ORGANIZED HEALTH CARE EDUCATION/TRAINING PROGRAM

## 2022-03-10 RX ORDER — ACETAMINOPHEN 325 MG/1
650 TABLET ORAL EVERY 4 HOURS PRN
Qty: 30 TABLET | Refills: 0 | Status: SHIPPED | OUTPATIENT
Start: 2022-03-10 | End: 2022-04-25 | Stop reason: ALTCHOICE

## 2022-03-10 RX ORDER — ACETAMINOPHEN AND CODEINE PHOSPHATE 120; 12 MG/5ML; MG/5ML
1 SOLUTION ORAL DAILY
Qty: 84 TABLET | Refills: 0 | Status: SHIPPED | OUTPATIENT
Start: 2022-03-10

## 2022-03-10 RX ORDER — IBUPROFEN 600 MG/1
600 TABLET ORAL EVERY 6 HOURS
Qty: 30 TABLET | Refills: 0 | Status: SHIPPED | OUTPATIENT
Start: 2022-03-10 | End: 2022-04-25 | Stop reason: ALTCHOICE

## 2022-03-10 RX ADMIN — IBUPROFEN 600 MG: 600 TABLET ORAL at 06:00

## 2022-03-10 RX ADMIN — IBUPROFEN 600 MG: 600 TABLET ORAL at 12:08

## 2022-03-10 NOTE — PROGRESS NOTES
Progress Note - OB/GYN   Rashaad Ramos 36 y o  female MRN: [id number]  Unit/Bed#: -01 Encounter: [id number]    Assessment:  PPD#1 s/p Spontaneous Vaginal Delivery, stable    Plan:    1) A2GDM   GTT at 6 weeks PP  2) Postpartum care  Encourage ambulation   Encourage breastfeeding  Continue current meds   3) Disposition   Anticipate discharge home today if baby cleared to go    Subjective/Objective     Subjective:     Patient reports feeling well, would like to go home later today if able, desires micronor for contraception    Pain: no  Tolerating PO: yes  Voiding: yes  Flatus: yes  BM: no  Ambulating: yes  Breastfeeding: Breastfeeding  Chest pain: no  Shortness of breath: no  Leg pain: no  Lochia: wnl    Objective:     Vitals:  Vitals:    03/09/22 1633 03/09/22 2032 03/09/22 2351 03/10/22 0551   BP: 119/67 99/61 (!) 89/52 104/64   BP Location: Right arm Right arm  Right arm   Pulse: 95 95 94 88   Resp: 18 18 18 18   Temp: 98 3 °F (36 8 °C) 97 8 °F (36 6 °C) 98 4 °F (36 9 °C) 97 6 °F (36 4 °C)   TempSrc: Oral Oral Oral Oral   SpO2:   100% 100%   Weight:       Height:           Physical Exam:   GEN: appears well, alert and oriented x 3, pleasant and cooperative   CV: +S1, +S2, no murmurs/rubs/gallops appreciated  RESP: no labored breathing  ABDOMEN: soft, no tenderness, no distention, Uterine fundus firm and non-tender, at the umbilicus   EXTREMITIES: non-tender  NEURO Alert and oriented to person, place, and time         Lab Results   Component Value Date    WBC 9 15 03/08/2022    HGB 12 4 03/08/2022    HCT 36 4 03/08/2022    MCV 88 03/08/2022     03/08/2022         Katie Bar MD, PGY-1  Obstetrics & Gynecology  03/10/22

## 2022-03-10 NOTE — LACTATION NOTE
This note was copied from a baby's chart  Discharge Lactation: mom states breastfeeding is going well  Mom states baby latches and output is meeting expectations  Encouraged feeding on demand, offering both breasts at every feeding, and signs of satiation  Education on feeding log  Mom has a zomee pump at home from ins  Mom denies baby and me appt at this time    Reviewed D/C book  Enc  To call lactation    Met with mother to go over discharge breastfeeding booklet including the feeding log  Emphasized 8 or more (12) feedings in a 24 hour period, what to expect for the number of diapers per day of life and the progression of properties of the  stooling pattern  Reviewed breastfeeding and your lifestyle, storage and preparation of breast milk, how to keep you breast pump clean, the employed breastfeeding mother and paced bottle feeding handouts  Booklet included Breastfeeding Resources for after discharge including access to the number for the [address]  Provided education on growth spurts, when to introduce bottles; paced bottle feeding, and non-nutritive suck at the breast  Provided education on Signs of satiation  Encouraged to call lactation to observe a latch prior to discharge for reassurance  Encouraged to call baby and me with any questions and closely monitor output

## 2022-03-10 NOTE — PLAN OF CARE
Problem: PAIN - ADULT  Goal: Verbalizes/displays adequate comfort level or baseline comfort level  Description: Interventions:  - Encourage patient to monitor pain and request assistance  - Assess pain using appropriate pain scale  - Administer analgesics based on type and severity of pain and evaluate response  - Implement non-pharmacological measures as appropriate and evaluate response  - Consider cultural and social influences on pain and pain management  - Notify physician/advanced practitioner if interventions unsuccessful or patient reports new pain  Outcome: Progressing     Problem: INFECTION - ADULT  Goal: Absence or prevention of progression during hospitalization  Description: INTERVENTIONS:  - Assess and monitor for signs and symptoms of infection  - Monitor lab/diagnostic results  - Monitor all insertion sites, i e  indwelling lines, tubes, and drains  - Monitor endotracheal if appropriate and nasal secretions for changes in amount and color  - [place] appropriate cooling/warming therapies per order  - Administer medications as ordered  - Instruct and encourage patient and family to use good hand hygiene technique  - Identify and instruct in appropriate isolation precautions for identified infection/condition  Outcome: Progressing  Goal: Absence of fever/infection during neutropenic period  Description: INTERVENTIONS:  - Monitor WBC    Outcome: Progressing     Problem: SAFETY ADULT  Goal: Patient will remain free of falls  Description: INTERVENTIONS:  - Educate patient/family on patient safety including physical limitations  - Instruct patient to call for assistance with activity   - Consult OT/PT to assist with strengthening/mobility   - Keep Call bell within reach  - Keep bed low and locked with side rails adjusted as appropriate  - Keep care items and personal belongings within reach  - Initiate and maintain comfort rounds  - Make Fall Risk Sign visible to staff  - Apply yellow socks and bracelet for high fall risk patients  - Consider moving patient to room near nurses station  Outcome: Progressing  Goal: Maintain or return to baseline ADL function  Description: INTERVENTIONS:  -  Assess patient's ability to carry out ADLs; assess patient's baseline for ADL function and identify physical deficits which impact ability to perform ADLs (bathing, care of mouth/teeth, toileting, grooming, dressing, etc )  - Assess/evaluate cause of self-care deficits   - Assess range of motion  - Assess patient's mobility; develop plan if impaired  - Assess patient's need for assistive devices and provide as appropriate  - Encourage maximum independence but intervene and supervise when necessary  - Involve family in performance of ADLs  - Assess for home care needs following discharge   - Consider OT consult to assist with ADL evaluation and planning for discharge  - Provide patient education as appropriate  Outcome: Progressing  Goal: Maintains/Returns to pre admission functional level  Description: INTERVENTIONS:  - Perform BMAT or MOVE assessment daily    - Set and communicate daily mobility goal to care team and patient/family/caregiver  - Collaborate with rehabilitation services on mobility goals if consulted  - Out of bed for toileting  - Record patient progress and toleration of activity level   Outcome: Progressing     Problem: Knowledge Deficit  Goal: Patient/family/caregiver demonstrates understanding of disease process, treatment plan, medications, and discharge instructions  Description: Complete learning assessment and assess knowledge base  Interventions:  - Provide teaching at level of understanding  - Provide teaching via preferred learning methods  Outcome: Completed  Goal: Verbalizes understanding of labor plan  Description: Assess patient/family/caregiver's baseline knowledge level and ability to understand information    Provide education via patient/family/caregiver's preferred learning method at appropriate level of understanding  1  Provide teaching at level of understanding  2  Provide teaching via preferred learning method(s)  Outcome: Completed     Problem: DISCHARGE PLANNING  Goal: Discharge to home or other facility with appropriate resources  Description: INTERVENTIONS:  - Identify barriers to discharge w/patient and caregiver  - Arrange for needed discharge resources and transportation as appropriate  - Identify discharge learning needs (meds, wound care, etc )  - Arrange for interpretive services to assist at discharge as needed  - Refer to Case Management Department for coordinating discharge planning if the patient needs post-hospital services based on physician/advanced practitioner order or complex needs related to functional status, cognitive ability, or social support system  Outcome: Progressing     Problem: Labor & Delivery  Goal: Manages discomfort  Description: Assess and monitor for signs and symptoms of discomfort  Assess patient's pain level regularly and per hospital policy  Administer medications as ordered  Support use of nonpharmacological methods to help control pain such as distraction, imagery, relaxation, and application of heat and cold  Collaborate with interdisciplinary team and patient to determine appropriate pain management plan  1  Include patient in decisions related to comfort  2  Offer non-pharmacological pain management interventions  3  Report ineffective pain management to physician  Outcome: Completed  Goal: Patient vital signs are stable  Description: 1  Assess vital signs - vaginal delivery    Outcome: Completed     Problem: BIRTH - VAGINAL/ SECTION  Goal: Fetal and maternal status remain reassuring during the birth process  Description: INTERVENTIONS:  - Monitor vital signs  - Monitor fetal heart rate  - Monitor uterine activity  - Monitor labor progression (vaginal delivery)  - DVT prophylaxis  - Antibiotic prophylaxis  Outcome: Completed  Goal: Emotionally satisfying birthing experience for mother/fetus  Description: Interventions:  - Assess, plan, implement and evaluate the nursing care given to the patient in labor  - Advocate the philosophy that each childbirth experience is a unique experience and support the family's chosen level of involvement and control during the labor process   - Actively participate in both the patient's and family's teaching of the birth process  - Consider cultural, Orthodoxy and age-specific factors and plan care for the patient in labor  Outcome: Completed     Problem: POSTPARTUM  Goal: Experiences normal postpartum course  Description: INTERVENTIONS:  - Monitor maternal vital signs  - Assess uterine involution and lochia  Outcome: Progressing  Goal: Appropriate maternal -  bonding  Description: INTERVENTIONS:  - Identify family support  - Assess for appropriate maternal/infant bonding   -Encourage maternal/infant bonding opportunities  - Referral to  or  as needed  Outcome: Progressing  Goal: Establishment of infant feeding pattern  Description: INTERVENTIONS:  - Assess breast/bottle feeding  - Refer to lactation as needed  Outcome: Progressing

## 2022-03-10 NOTE — LACTATION NOTE
This note was copied from a baby's chart  CONSULT - LACTATION  Baby Girl (Isaura Vásquez) Marylinrmireya 0 days female MRN: [id number]    [facility] NURSERY Room / Bed: (N)/(N) Encounter: [id number]    Maternal Information     MOTHER:  Checo Allen  Maternal Age: 36 y o    OB History: # 1 - Date: None, Sex: None, Weight: None, GA: None, Delivery: None, Apgar1: None, Apgar5: None, Living: None, Birth Comments: None   Previouse breast reduction surgery? No    Lactation history:   Has patient previously breast fed: How long had patient previously breast fed:     Previous breast feeding complications:       Past Surgical History:   Procedure Laterality Date    LAPAROSCOPIC CHOLECYSTECTOMY      OVARIAN CYST REMOVAL      TOOTH EXTRACTION          Birth information:  YOB: 2020   Time of birth: 10:53 AM   Sex: female   Delivery type: Vaginal, Spontaneous   Birth Weight: 2845 g (6 lb 4 4 oz)   Percent of Weight Change: 0%     Gestational Age: 42w0d   [unfilled]    Assessment     Breast and nipple assessment: normal assessment    [place] Assessment: normal assessment    Feeding assessment: feeding well  LATCH:  Latch: Repeated attempts, hold nipple in mouth, stimulate to suck   Audible Swallowing: A few with stimulation   Type of Nipple: Everted (After stimulation)   Comfort (Breast/Nipple): Soft/non-tender   Hold (Positioning): Partial assist, teach one side, mother does other, staff holds   LATCH Score: 7          Feeding recommendations:  breast feed on demand     Met with mother  Provided mother with Ready, Set, Baby booklet  Discussed Skin to Skin contact an benefits to mom and baby  Talked about the delay of the first bath until baby has adjusted  Spoke about the benefits of rooming in  Feeding on cue and what that means for recognizing infant's hunger  Avoidance of pacifiers for the first month discussed   Talked about exclusive breastfeeding for the first 6 months  Positioning and latch reviewed as well as showing images of other feeding positions  Discussed the properties of a good latch in any position  Reviewed hand/manual expression  Discussed s/s that baby is getting enough milk and some s/s that breastfeeding dyad may need further help  Gave information on common concerns, what to expect the first few weeks after delivery, preparing for other caregivers, and how partners can help  Resources for support also provided  Information on hand expression given  Discussed benefits of knowing how to manually express breast including stimulating milk supply, softening nipple for latch and evacuating breast in the event of engorgement  Discussed 2nd night syndrome and ways to calm infant  Hand out given  Provided DC booklet at this time, enc family to review and prepare questions for day of DC  Assisted parents to place baby skin to skin in football hold  Reverse pressure softening performed around areola  Discussed importance of alignment of baby's ear, shoulder, and hip in any preferred position  Worked on supporting baby at breast level and beginning the feed with baby's nose arriving at the nipple  Then, using areolar compression while guiding baby chin-forward to the breast to achieve a deep, comfortable latch  Baby gapes and latches after a few attempts on and off, sustains suck for a few minutes before becoming sleepy  Baby is on BS protocol and is stable so far  Enc Mom to cont practicing positioning and latch technique and call for lactation support as needed throughout her stay  Mom has a breast pump at home        Arabella Maguire RN 3/9/2022 7:02 PM

## 2022-03-15 LAB — PLACENTA IN STORAGE: NORMAL

## 2022-03-29 ENCOUNTER — POSTPARTUM VISIT (OUTPATIENT)
Dept: OBGYN CLINIC | Facility: MEDICAL CENTER | Age: 32
End: 2022-03-29

## 2022-03-29 VITALS
SYSTOLIC BLOOD PRESSURE: 92 MMHG | BODY MASS INDEX: 28.02 KG/M2 | WEIGHT: 148.4 LBS | HEIGHT: 61 IN | DIASTOLIC BLOOD PRESSURE: 62 MMHG

## 2022-03-29 DIAGNOSIS — H93.8X1 SENSATION OF FULLNESS IN RIGHT EAR: ICD-10-CM

## 2022-03-29 PROBLEM — Z34.00 SUPERVISION OF NORMAL FIRST PREGNANCY: Status: RESOLVED | Noted: 2021-09-07 | Resolved: 2022-03-29

## 2022-03-29 PROBLEM — Z34.93 PRENATAL CARE IN THIRD TRIMESTER: Status: RESOLVED | Noted: 2022-03-01 | Resolved: 2022-03-29

## 2022-03-29 PROBLEM — O99.810 HYPERGLYCEMIA DURING PREGNANCY: Status: RESOLVED | Noted: 2022-03-02 | Resolved: 2022-03-29

## 2022-03-29 PROCEDURE — 99024 POSTOP FOLLOW-UP VISIT: CPT | Performed by: NURSE PRACTITIONER

## 2022-03-29 NOTE — ASSESSMENT & PLAN NOTE
Normal postpartum exam  The patient may advance activity as tolerated and may resume sexual activity after internal exam in 3 weeks  She has Micronor at home but did not start yet  Advised to start ASAP  She will need scrpt for 2 HR GTT @ F/U PP exam   She will RTO in 3 weeks for F/U PP exam and then for routine annual gyn exam in 3-6 mos   The patient agrees with plan

## 2022-03-29 NOTE — PROGRESS NOTES
Postpartum examination following vaginal delivery    Normal postpartum exam  The patient may advance activity as tolerated and may resume sexual activity after internal exam in 3 weeks  She has Micronor at home but did not start yet  Advised to start ASAP  She will need scrpt for 2 HR GTT @ F/U PP exam   She will RTO in 3 weeks for F/U PP exam and then for routine annual gyn exam in 3-6 mos  The patient agrees with plan    Sensation of fullness in right ear  Since delivery  Some mild headaches as well  BP normal   Advised mild decongestant and F/U with PCP  Agrees to plan  Diagnoses and all orders for this visit:    Postpartum examination following vaginal delivery    Sensation of fullness in right ear         OB POSTPARTUM VISIT PROGRESS NOTE  Date of Encounter: 3/29/2022    Cliffmariah Singerkathryn    : 1990  (36 y o )  MR: [id number]    Jayy Oswald is in for her postpartum visit  She is now   She delivered female [place] 6-4 with apgars of 9/9) by normal spontaneous vaginal delivery on 3/9/22 with first degree perineal laceration  Pregnancy was complicated by poorly controlled insulin dependant diabetes  Induced for same  She's generally doing well, denies current pain or bleeding issues, and has no significant depression issues  She is breast feeding exclusively  We discussed contraceptive options while nursing including progesterone only pills, DepoProvera, Nexplanon, IUDs, and condoms  She chooses Micronor     Review of Systems   Respiratory: Negative  Cardiovascular: Negative  Gastrointestinal: Negative for constipation and diarrhea  Genitourinary: Negative for difficulty urinating, pelvic pain, vaginal bleeding, vaginal discharge, itching or odor  [place] Scale=3    O:  Blood pressure 92/62, height 5' 1" (1 549 m), weight 67 3 kg (148 lb 6 4 oz), last menstrual period 2021, currently breastfeeding      Patient appears well and is not in distress  Neck is supple without masses  Heart regular rate and rhythm  Lungs CTA bilaterally   Abdomen is soft and nontender without masses  External genitals are normal without lesions or rashes    Perineal laceration well healed

## 2022-03-29 NOTE — ASSESSMENT & PLAN NOTE
Since delivery  Some mild headaches as well  BP normal   Advised mild decongestant and F/U with PCP  Agrees to plan

## 2022-04-25 ENCOUNTER — POSTPARTUM VISIT (OUTPATIENT)
Dept: OBGYN CLINIC | Facility: MEDICAL CENTER | Age: 32
End: 2022-04-25

## 2022-04-25 VITALS — BODY MASS INDEX: 28.38 KG/M2 | WEIGHT: 150.2 LBS | DIASTOLIC BLOOD PRESSURE: 64 MMHG | SYSTOLIC BLOOD PRESSURE: 120 MMHG

## 2022-04-25 DIAGNOSIS — O24.414 INSULIN CONTROLLED GESTATIONAL DIABETES MELLITUS (GDM) IN THIRD TRIMESTER: ICD-10-CM

## 2022-04-25 PROCEDURE — 99024 POSTOP FOLLOW-UP VISIT: CPT | Performed by: PHYSICIAN ASSISTANT

## 2022-04-25 NOTE — PROGRESS NOTES
Ulysses Rodgers  1990    S:  36 y o  female here for postpartum visit  She is s/p vaginal delivery on 3/9/22 (induced for poorly controlled insulin-dependent GDM)  Gender: female - Ann  Apgars:  9,9  Weight: 6 lbs  4oz  Her bleeding has resolved  She is Breastfeeding without problems  She denies postpartum blues/depression  Last Pap: 8/3/20 neg    She is taking Micronor for contraception       Past Medical History:   Diagnosis Date    GERD (gastroesophageal reflux disease)     Ovarian cyst     Renal calculi     Varicella     as a child     Family History   Problem Relation Age of Onset    Psoriasis Father     Stroke Maternal Grandmother     Diabetes type II Paternal Grandfather     No Known Problems Mother     Heart disease Maternal Grandfather     Alcohol abuse Maternal Grandfather     Brain cancer Paternal Grandmother     Lupus Paternal Aunt     Breast cancer Neg Hx     Ovarian cancer Neg Hx     Cervical cancer Neg Hx     Colon cancer Neg Hx      Social History     Socioeconomic History    Marital status: /Civil Union     Spouse name: None    Number of children: None    Years of education: None    Highest education level: None   Occupational History    None   Tobacco Use    Smoking status: Never Smoker    Smokeless tobacco: Never Used   Vaping Use    Vaping Use: Never used   Substance and Sexual Activity    Alcohol use: Not Currently     Comment: not since (+) pregnancy    Drug use: No    Sexual activity: Yes     Partners: Male   Other Topics Concern    None   Social History Narrative    Denied Coffee Consumption    Lack of Exercise     Social Determinants of Health     Financial Resource Strain: Not on file   Food Insecurity: Not on file   Transportation Needs: Not on file   Physical Activity: Not on file   Stress: Not on file   Social Connections: Not on file   Intimate Partner Violence: Not on file   Housing Stability: Not on file       O:   BP 120/64 (BP Location: Right arm, Patient Position: Sitting, Cuff Size: Standard)   Wt 68 1 kg (150 lb 3 2 oz)   LMP 06/16/2021 (LMP Unknown)   Breastfeeding Yes   BMI 28 38 kg/m²     She appears well and in no distress  Abdomen is soft and nontender  External genitals are normal    A/P:  Postpartum visit  Contraception - Micronor   Pap due 2023  Slip given for 2hr gtt     She will return in 3 months for her yearly exam, sooner PRN

## 2022-07-26 ENCOUNTER — ANNUAL EXAM (OUTPATIENT)
Dept: OBGYN CLINIC | Facility: MEDICAL CENTER | Age: 32
End: 2022-07-26
Payer: COMMERCIAL

## 2022-07-26 VITALS
SYSTOLIC BLOOD PRESSURE: 108 MMHG | HEIGHT: 62 IN | WEIGHT: 156.6 LBS | BODY MASS INDEX: 28.82 KG/M2 | DIASTOLIC BLOOD PRESSURE: 68 MMHG

## 2022-07-26 DIAGNOSIS — Z01.419 ROUTINE GYNECOLOGICAL EXAMINATION: Primary | ICD-10-CM

## 2022-07-26 DIAGNOSIS — Z30.09 COUNSELING FOR INITIATION OF BIRTH CONTROL METHOD: ICD-10-CM

## 2022-07-26 PROBLEM — O24.414 INSULIN CONTROLLED GESTATIONAL DIABETES MELLITUS (GDM) IN THIRD TRIMESTER: Status: RESOLVED | Noted: 2022-01-31 | Resolved: 2022-07-26

## 2022-07-26 PROCEDURE — S0612 ANNUAL GYNECOLOGICAL EXAMINA: HCPCS | Performed by: PHYSICIAN ASSISTANT

## 2022-07-26 NOTE — PROGRESS NOTES
Patient presents for a routine annual visit   3/8/2022  Last Pap Smear- 8/3/2020 due 8/3/2023  LMP- 21  Birth control- condoms (pt states she has stopped taking OCP due to difficulty taking everyday)    Non smoker  Social drinker  Currently sexually active  No family history of uterine, ovarian, cervical or breast cancer     No concerns/questions for today's visit

## 2022-07-26 NOTE — PROGRESS NOTES
Assessment   36 y o  Jacob Epp presenting for annual exam      Plan   Diagnoses and all orders for this visit:    Routine gynecological examination  Normal findings on routine exam   Encouraged 150 min of exercise per week  Reviewed balanced diet  Multivitamin encouraged   Breast awareness/SBE encouraged  Reviewed mood changes in the PP period and s/sx to report  Counseling for initiation of birth control method  We reviewed available contraception options, along with the risks, benefits, and efficacy profile of each method in detail  This review included discussion of the IUD (Mirena, Paragard, [place], [place]), injectable (Depo Provera), implant (Nexplanon), patch Stefani Buys), oral contraceptives (VANDA and POP), vaginal rings (NuvaRing), and barrier methods (condoms)  Denies migraine with aura, HTN, CV ds, liver disease, personal hx or current dx of breast CA, or prior blood clot  No FH of clot formation  Nonsmoker  Aware to notify office if would like alternate contraceptive method going forward  She is aware of ideal interpregnancy interval and reasoning for this  Pap due     RTO one year for yearly exam or sooner as needed  __________________________________________________________________    Subjective     Kori Bang is a 36 y o  Jacob Epp presenting for annual exam  She is without complaint and does not want STD testing today  Here for first annual after delivery of her daughter by  3/9/22  Daughter is 4 months and thriving! 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 from home  This has been helpful with breastfeeding  Would like to continue until 6 months, but admittedly finds pumping and feeding stressful and is unsure if she will continue   supported  Does not feel she needs to see counselor at this time and no SI/HI  SCREENING  Last Pap: 2020 NILM  Last Mammo: N/A  Last Colonoscopy: N/A      GYN  Periods: amenorrheic since delivery, breastfeeding  Sexually active: Yes - single partner -   Contraception: Condoms - micronor ordered but found she was unable to be consistent with taking  Planning to continue breastfeeding to 6 months and use condoms until then  Thinking would like to use vaginal ring to follow, but is still considering options  Hx STI: denies     Hx Abnormal pap: denies  We reviewed ASCCP guidelines for Pap testing today  Denies vaginal discharge, itching, odor, dyspareunia, pelvic pain and vulvar/vaginal symptoms    OB     Desires future fertility - not anytime soon, would like children to be 2-3 years apart    Complaints: denies   Denies urgency, frequency, hematuria, leakage / change in stream, difficulty urinating  BREAST  Complaints: denies   Denies: breast lump, breast tenderness, nipple discharge, skin color change, and skin lesion(s)  Personal hx: N/A      Pertinent Family Hx:   Family hx of breast cancer: no  Family hx of ovarian cancer: no  Family hx of colon cancer: no      GENERAL  PMH reviewed/updated and is as below  Patient does follow with a PCP      Past Medical History:   Diagnosis Date    GERD (gastroesophageal reflux disease)     Insulin controlled gestational diabetes mellitus (GDM) in third trimester 2022    Ovarian cyst     Renal calculi     Varicella     as a child       Past Surgical History:   Procedure Laterality Date    LAPAROSCOPIC CHOLECYSTECTOMY      OVARIAN CYST REMOVAL      TOOTH EXTRACTION           Current Outpatient Medications:     Prenatal Vit-Fe Fumarate-FA (PRENATAL PO), Take by mouth, Disp: , Rfl:     norethindrone (Ortho Micronor) 0 35 MG tablet, Take 1 tablet (0 35 mg total) by mouth daily (Patient not taking: Reported on 2022), Disp: 84 tablet, Rfl: 0    No Known Allergies    Social History     Socioeconomic History    Marital status: /Civil Union     Spouse name: Not on file    Number of children: Not on file    Years of education: Not on file    Highest education level: Not on file   Occupational History    Not on file   Tobacco Use    Smoking status: Never Smoker    Smokeless tobacco: Never Used   Vaping Use    Vaping Use: Never used   Substance and Sexual Activity    Alcohol use: Yes     Comment: social    Drug use: No    Sexual activity: Yes     Partners: Male     Birth control/protection: Condom Male   Other Topics Concern    Not on file   Social History Narrative    Denied Coffee Consumption    Lack of Exercise     Social Determinants of Health     Financial Resource Strain: Not on file   Food Insecurity: Not on file   Transportation Needs: Not on file   Physical Activity: Not on file   Stress: Not on file   Social Connections: Not on file   Intimate Partner Violence: Not on file   Housing Stability: Not on file       Review of Systems     ROS:  Constitutional: Negative for fatigue and unexpected weight change  Respiratory: Negative for cough and shortness of breath  Cardiovascular: Negative for chest pain and palpitations  Gastrointestinal: Negative for abdominal pain and change in bowel habits  Breasts:  Negative, other than as noted above  Genitourinary: Negative, other than as noted above  Psychiatric: Negative for mood difficulties  Objective      /68 (BP Location: Left arm, Patient Position: Sitting)   Ht 5' 1 75" (1 568 m)   Wt 71 kg (156 lb 9 6 oz)   Breastfeeding Yes   BMI 28 87 kg/m²     Physical Examination:    Patient appears well and is not in distress  Neck is supple without masses  Breasts are symmetrical without mass, tenderness, nipple discharge, skin changes or adenopathy  Abdomen is soft and nontender without masses  External genitals are normal without lesions or rashes    Urethral meatus and urethra are normal  Bladder is normal to palpation  Vagina is normal without discharge or bleeding  Cervix is normal without discharge or lesion  Uterus is normal, mobile, nontender without palpable mass  Adnexa are normal, nontender, without palpable mass

## 2023-02-24 NOTE — PROGRESS NOTES
Date: 02/01/22  Grace Armenta  1990  Estimated Date of Delivery: 3/23/22  32w6d  OB/GYN: Jeferson Petit  Diagnosis:    -Irritable bowel Syndrome and Overweight prior to pregnancy   History of cholecystectomy - needs to limit fat in her diet as a result  Plan:  Monitor FBS per Dr Coyle Book: 02/01/2022    Recommended on 01/27/2022 for patient to call [phone] to schedule an appointment [facility] for FBS >90    -01/21/2022: Advised patient to change bedtime snack to 1 CHO serving (15 gms) & 2-3 oz protein  Diet: 1800 Gestational diabetes meal plan; 3 meals and 3 snacks  CHO: [id number]      PRO:2-1-3-1-3-2/3   Avoids juice, pork & pork products, juice, & artificial sweeteners  SMBG: Checks blood sugar 4 times per day; fasting and 2 hour start of each meal    Meter: Free style Freedom Lite glucose meter  Activity: Walks 30 minutes daily, follow OB recommendations  Support System: Significant Other  Patient Goal: I will eat 3 meals and 3 snacks each day, including protein at each  Education:  Class 1 completed with Marcia Vickers on 01/13/2022  Class 2 completed with Marcia Vickers on 01/21/2022       Labs  Labs ordered 01/27/2022 - needs to complete     Ultrasounds  11/18/2021  Normal growth and JESSICA   01/31/2022 Normal growth and JESSICA    EFW: 29 % AC: 26 % HC: 6 %    Further fetal surveillance  Per MFM recommendations    Jacob Wilhelm RN
No assistance needed

## 2024-01-12 ENCOUNTER — OFFICE VISIT (OUTPATIENT)
Dept: OBGYN CLINIC | Facility: MEDICAL CENTER | Age: 34
End: 2024-01-12
Payer: COMMERCIAL

## 2024-01-12 VITALS
SYSTOLIC BLOOD PRESSURE: 112 MMHG | WEIGHT: 158 LBS | BODY MASS INDEX: 29.08 KG/M2 | HEIGHT: 62 IN | DIASTOLIC BLOOD PRESSURE: 62 MMHG

## 2024-01-12 DIAGNOSIS — Z30.09 ENCOUNTER FOR COUNSELING REGARDING CONTRACEPTION: ICD-10-CM

## 2024-01-12 DIAGNOSIS — Z01.419 ENCOUNTER FOR GYNECOLOGICAL EXAMINATION (GENERAL) (ROUTINE) WITHOUT ABNORMAL FINDINGS: Primary | ICD-10-CM

## 2024-01-12 DIAGNOSIS — D22.9 CHANGE IN MULTIPLE NEVI: ICD-10-CM

## 2024-01-12 DIAGNOSIS — Z12.4 SCREENING FOR CERVICAL CANCER: ICD-10-CM

## 2024-01-12 PROBLEM — H93.8X1 SENSATION OF FULLNESS IN RIGHT EAR: Status: RESOLVED | Noted: 2022-03-29 | Resolved: 2024-01-12

## 2024-01-12 PROBLEM — Z83.49 FAMILY HISTORY OF CYSTIC FIBROSIS: Status: RESOLVED | Noted: 2021-11-08 | Resolved: 2024-01-12

## 2024-01-12 PROCEDURE — G0476 HPV COMBO ASSAY CA SCREEN: HCPCS | Performed by: CLINICAL NURSE SPECIALIST

## 2024-01-12 PROCEDURE — G0145 SCR C/V CYTO,THINLAYER,RESCR: HCPCS | Performed by: CLINICAL NURSE SPECIALIST

## 2024-01-12 PROCEDURE — S0612 ANNUAL GYNECOLOGICAL EXAMINA: HCPCS | Performed by: CLINICAL NURSE SPECIALIST

## 2024-01-12 RX ORDER — NORETHINDRONE ACETATE AND ETHINYL ESTRADIOL .02; 1 MG/1; MG/1
1 TABLET ORAL DAILY
Qty: 84 TABLET | Refills: 3 | Status: SHIPPED | OUTPATIENT
Start: 2024-01-12

## 2024-01-12 NOTE — PROGRESS NOTES
Subjective:        Scarlet Mota is a 33 y.o. female. Here for Gynecologic Exam (Pap 8/3/2020 neg/Birth control interested in OCP)      GYN HPI  Menstrual cycle:  Patient denies menstrual complaints. Regular monthly menses, not excessive  Vaginal c/o: denies  Urinary c/o: denies  Breast complaints:denies.  Having lateral/posterior chest wall tenderness and has order for CXR from PCP  She does do self breast Exams    Sexually active:: yes, same partner  Contraception: none presently no longer breastfeeding and would like to go back on Microgestin   She reports she feels safe at home.     She is requesting referral to derm for changes she noticed in a mole.  Dietary calcium/vit D  intake: yes  Lifestyle: working to become more active due to weight gain while breastfeeding.  Has lost 5#'s so far.    Hereditary Cancer Screening  Cancer-related family history includes Brain cancer in her paternal grandmother. There is no history of Breast cancer, Ovarian cancer, Cervical cancer, or Colon cancer.    Substance Abuse Screening Completed. See hx and flowsheet.     The following portions of the patient's history were reviewed and updated as appropriate: allergies, current medications, past family history, past medical history, past social history, past surgical history, and problem list.       Review of Systems   Constitutional:  Negative for appetite change, chills, fatigue, fever and unexpected weight change.   HENT: Negative.     Eyes: Negative.    Respiratory:  Negative for chest tightness and shortness of breath.    Cardiovascular:  Negative for chest pain and palpitations.   Gastrointestinal:  Negative for abdominal pain, constipation and vomiting.   Endocrine: Negative for cold intolerance and heat intolerance.   Genitourinary:         As per HPI   Musculoskeletal:  Negative for back pain, joint swelling and neck pain.   Skin:  Negative for color change and rash.   Neurological:  Negative for dizziness, weakness  "and numbness.   Hematological:  Does not bruise/bleed easily.   Psychiatric/Behavioral: Negative.             Objective:  /62 (BP Location: Left arm, Patient Position: Sitting, Cuff Size: Standard)   Ht 5' 1.75\" (1.568 m)   Wt 71.7 kg (158 lb)   LMP 01/03/2024 (Exact Date)   Breastfeeding No   BMI 29.13 kg/m²        Physical Exam  Constitutional:       General: She is not in acute distress.     Appearance: Normal appearance.   Genitourinary:      Vulva and rectum normal.      No lesions in the vagina.      Right Labia: No rash or lesions.     Left Labia: No lesions or rash.     No vaginal discharge, erythema, tenderness or bleeding.        Right Adnexa: not tender and no mass present.     Left Adnexa: not tender and no mass present.     No cervical motion tenderness, discharge or friability.      Uterus is not enlarged or tender.      No urethral prolapse present.      Pelvic exam was performed with patient in the lithotomy position.   Breasts:     Breasts are symmetrical.      Right: No inverted nipple, mass, nipple discharge, skin change or tenderness.      Left: No inverted nipple, mass, nipple discharge, skin change or tenderness.   HENT:      Head: Normocephalic and atraumatic.   Cardiovascular:      Rate and Rhythm: Normal rate.      Heart sounds: No murmur heard.  Pulmonary:      Effort: Pulmonary effort is normal.      Breath sounds: Normal breath sounds.   Abdominal:      General: There is no distension.      Palpations: Abdomen is soft.      Tenderness: There is no abdominal tenderness.   Musculoskeletal:         General: Normal range of motion.      Cervical back: Normal range of motion.   Lymphadenopathy:      Cervical: No cervical adenopathy.   Neurological:      Mental Status: She is alert and oriented to person, place, and time.   Skin:     General: Skin is warm and dry.   Psychiatric:         Mood and Affect: Mood normal.         Behavior: Behavior normal.   Vitals reviewed.         "     Assessment/Plan:           ANNUAL GYN EXAM- Primary  Annual GYN examination completed today.   Health maintenance reviewed/updated as appropriate    HEALTH MAINTENANCE SCREENINGS:    Last Papanicolaou test:  08/03/2020   History of abnormal pap: No    IMMUNIZATIONS  Gardasil HPV vaccine was not completed    Cervical cancer screen :Previous pap smears and ASCCP screening guidelines have been reviewed. Pap collected .  Breast Health: Encouraged regular self breast exams  Risk prevention and anticipatory guidance provided including:  Age related Calcium and vitamin D intake  Dietary and lifestyle recommendations based on her age and weight. body mass index is 29.13 kg/m²..    Tobacco and alcohol use, intervention ordered if applicable.   Condom use for prevention of STI's. Declined STI Screening.  Contraception:  Contraceptive options were reviewed, including hormonal methods, both combination (pill, patch, vaginal ring) and progesterone-only (pill, Depo Provera and Nexplanon), intrauterine devices (Mirena/Liletta, Sandy and Paragard), barrier methods (condoms, diaphragm) and male/female sterilization. The mechanisms, risks, benefits and side effects of all methods were discussed.  All questions have been answered to her satisfaction.  Patient chooses oral contraceptives (estrogen/progesterone).    Problem List Items Addressed This Visit    None  Visit Diagnoses       Encounter for gynecological examination (general) (routine) without abnormal findings    -  Primary    Screening for cervical cancer        Relevant Orders    Liquid-based pap, screening    Encounter for counseling regarding contraception        Relevant Medications    norethindrone-ethinyl estradiol (MICROGESTIN) 1-20 MG-MCG per tablet    Change in multiple nevi        Relevant Orders    Ambulatory Referral to Dermatology            Orders Placed This Encounter   Procedures    Ambulatory Referral to Dermatology

## 2024-01-15 LAB
HPV HR 12 DNA CVX QL NAA+PROBE: NEGATIVE
HPV16 DNA CVX QL NAA+PROBE: NEGATIVE
HPV18 DNA CVX QL NAA+PROBE: NEGATIVE

## 2024-01-18 LAB
LAB AP GYN PRIMARY INTERPRETATION: NORMAL
Lab: NORMAL

## 2024-10-01 ENCOUNTER — APPOINTMENT (OUTPATIENT)
Dept: LAB | Facility: CLINIC | Age: 34
End: 2024-10-01
Payer: COMMERCIAL

## 2024-10-01 DIAGNOSIS — Z00.01 ENCOUNTER FOR GENERAL ADULT MEDICAL EXAMINATION WITH ABNORMAL FINDINGS: ICD-10-CM

## 2024-10-01 DIAGNOSIS — R53.83 FATIGUE, UNSPECIFIED TYPE: ICD-10-CM

## 2024-10-01 LAB
ALBUMIN SERPL BCG-MCNC: 4.2 G/DL (ref 3.5–5)
ALP SERPL-CCNC: 74 U/L (ref 34–104)
ALT SERPL W P-5'-P-CCNC: 13 U/L (ref 7–52)
ANION GAP SERPL CALCULATED.3IONS-SCNC: 7 MMOL/L (ref 4–13)
AST SERPL W P-5'-P-CCNC: 13 U/L (ref 13–39)
B BURGDOR IGG+IGM SER QL IA: NEGATIVE
BASOPHILS # BLD AUTO: 0.05 THOUSANDS/ÂΜL (ref 0–0.1)
BASOPHILS NFR BLD AUTO: 1 % (ref 0–1)
BILIRUB SERPL-MCNC: 0.43 MG/DL (ref 0.2–1)
BUN SERPL-MCNC: 14 MG/DL (ref 5–25)
CALCIUM SERPL-MCNC: 8.8 MG/DL (ref 8.4–10.2)
CHLORIDE SERPL-SCNC: 105 MMOL/L (ref 96–108)
CHOLEST SERPL-MCNC: 157 MG/DL
CO2 SERPL-SCNC: 24 MMOL/L (ref 21–32)
CREAT SERPL-MCNC: 0.78 MG/DL (ref 0.6–1.3)
EOSINOPHIL # BLD AUTO: 0.25 THOUSAND/ÂΜL (ref 0–0.61)
EOSINOPHIL NFR BLD AUTO: 3 % (ref 0–6)
ERYTHROCYTE [DISTWIDTH] IN BLOOD BY AUTOMATED COUNT: 11.7 % (ref 11.6–15.1)
GFR SERPL CREATININE-BSD FRML MDRD: 101 ML/MIN/1.73SQ M
GLUCOSE P FAST SERPL-MCNC: 108 MG/DL (ref 65–99)
HCT VFR BLD AUTO: 41.6 % (ref 34.8–46.1)
HDLC SERPL-MCNC: 57 MG/DL
HGB BLD-MCNC: 14 G/DL (ref 11.5–15.4)
IMM GRANULOCYTES # BLD AUTO: 0.03 THOUSAND/UL (ref 0–0.2)
IMM GRANULOCYTES NFR BLD AUTO: 0 % (ref 0–2)
LDLC SERPL CALC-MCNC: 73 MG/DL (ref 0–100)
LYMPHOCYTES # BLD AUTO: 2.62 THOUSANDS/ÂΜL (ref 0.6–4.47)
LYMPHOCYTES NFR BLD AUTO: 34 % (ref 14–44)
MCH RBC QN AUTO: 29.7 PG (ref 26.8–34.3)
MCHC RBC AUTO-ENTMCNC: 33.7 G/DL (ref 31.4–37.4)
MCV RBC AUTO: 88 FL (ref 82–98)
MONOCYTES # BLD AUTO: 0.39 THOUSAND/ÂΜL (ref 0.17–1.22)
MONOCYTES NFR BLD AUTO: 5 % (ref 4–12)
NEUTROPHILS # BLD AUTO: 4.29 THOUSANDS/ÂΜL (ref 1.85–7.62)
NEUTS SEG NFR BLD AUTO: 57 % (ref 43–75)
NONHDLC SERPL-MCNC: 100 MG/DL
NRBC BLD AUTO-RTO: 0 /100 WBCS
PLATELET # BLD AUTO: 224 THOUSANDS/UL (ref 149–390)
PMV BLD AUTO: 9.3 FL (ref 8.9–12.7)
POTASSIUM SERPL-SCNC: 4 MMOL/L (ref 3.5–5.3)
PROT SERPL-MCNC: 7.2 G/DL (ref 6.4–8.4)
RBC # BLD AUTO: 4.71 MILLION/UL (ref 3.81–5.12)
SODIUM SERPL-SCNC: 136 MMOL/L (ref 135–147)
TRIGL SERPL-MCNC: 133 MG/DL
TSH SERPL DL<=0.05 MIU/L-ACNC: 2.17 UIU/ML (ref 0.45–4.5)
WBC # BLD AUTO: 7.63 THOUSAND/UL (ref 4.31–10.16)

## 2024-10-01 PROCEDURE — 80061 LIPID PANEL: CPT

## 2024-10-01 PROCEDURE — 86618 LYME DISEASE ANTIBODY: CPT

## 2024-10-01 PROCEDURE — 84443 ASSAY THYROID STIM HORMONE: CPT

## 2024-10-01 PROCEDURE — 80053 COMPREHEN METABOLIC PANEL: CPT

## 2024-10-01 PROCEDURE — 36415 COLL VENOUS BLD VENIPUNCTURE: CPT

## 2024-10-01 PROCEDURE — 85025 COMPLETE CBC W/AUTO DIFF WBC: CPT

## 2024-10-22 ENCOUNTER — NURSE TRIAGE (OUTPATIENT)
Age: 34
End: 2024-10-22

## 2024-10-22 NOTE — TELEPHONE ENCOUNTER
"Patient calling to report concern for irregular vaginal bleeding. Missed a couple pills and developed abnormal bleeding that turned in to heavier period bleeding. Stopped taking pills at that time to allow bleeding for period though off cycle. Restarted pills two weeks ago and irregular bleeding restarted over the weekend so she stopped taking the pill again. Advised that patient take 2 pills today and 2 tomorrow then continue pill pack as normal or take month off of pill and restart after. Patient verbalizes understanding. Advised she call back if bleeding is still irregular next month. Advised keeping track bleeding for now. Reviewed to call or seek emergency care if she is saturating a pad with blood every hour for 2 or more hours, passes a clot the size of a golf ball or larger, or if she has severe, debilitating pain that makes it difficult to get out of bed or walk.      Reason for Disposition  • Taking birth control pills and has missed one or more pills; or took a progestin-only pill late    Answer Assessment - Initial Assessment Questions  1. AMOUNT: \"Describe the bleeding that you are having.\"     - SPOTTING: spotting, or pinkish / brownish mucous discharge; does not fill panti-liner or pad     - MILD:  less than 1 pad / hour; less than patient's usual menstrual bleeding    - MODERATE: 1-2 pads / hour; 1 menstrual cup every 6 hours; small-medium blood clots (e.g., pea, grape, small coin)    - SEVERE: soaking 2 or more pads/hour for 2 or more hours; 1 menstrual cup every 2 hours; bleeding not contained by pads or continuous red blood from vagina; large blood clots (e.g., golf ball, large coin)       mild  2. ONSET: \"When did the bleeding begin?\" \"Is it continuing now?\"      1 month  3. MENSTRUAL PERIOD: \"When was the last normal menstrual period?\" \"How is this different than your period?\"      2 weeks ago  4. REGULARITY: \"How regular are your periods?\"      Regular   5. ABDOMINAL PAIN: \"Do you have any " "pain?\" \"How bad is the pain?\"  (e.g., Scale 1-10; mild, moderate, or severe)    - MILD (1-3): doesn't interfere with normal activities, abdomen soft and not tender to touch     - MODERATE (4-7): interferes with normal activities or awakens from sleep, tender to touch     - SEVERE (8-10): excruciating pain, doubled over, unable to do any normal activities       no  6. PREGNANCY: \"Could you be pregnant?\" \"Are you sexually active?\" \"Did you recently give birth?\"      no  7. BREASTFEEDING: \"Are you breastfeeding?\"       8. HORMONES: \"Are you taking any hormone medications, prescription or OTC?\" (e.g., birth control pills, estrogen)      OCP  9. BLOOD THINNERS: \"Do you take any blood thinners?\" (e.g., Coumadin/warfarin, Pradaxa/dabigatran, aspirin)      no  10. CAUSE: \"What do you think is causing the bleeding?\" (e.g., recent gyn surgery, recent gyn procedure; known bleeding disorder, cervical cancer, polycystic ovarian disease, fibroids)          Missed pills  11. HEMODYNAMIC STATUS: \"Are you weak or feeling lightheaded?\" If Yes, ask: \"Can you stand and walk normally?\"           12. OTHER SYMPTOMS: \"What other symptoms are you having with the bleeding?\" (e.g., passed tissue, vaginal discharge, fever, menstrual-type cramps)        denies    Protocols used: Vaginal Bleeding - Abnormal-ADULT-[place]    "

## 2025-01-16 NOTE — PROGRESS NOTES
Name: Scarlet Mota      : 1990      MRN: [id number]  Encounter Provider: PHILIPPE Matthews  Encounter Date: 2025   Encounter department: [facility] [place]    :  Assessment & Plan  Encounter for gynecological examination (general) (routine) without abnormal findings    Annual GYN examination completed today.   Health maintenance reviewed/updated as appropriate  Risk prevention and anticipatory guidance provided including:  Age related Calcium and vitamin D intake  Encouraged Breast self exams and to call with changes.  Dietary and lifestyle recommendations based on her age and weight. body mass index is 28.95 kg/m²..    Tobacco and alcohol use, intervention ordered if applicable.   Condom use for prevention of STI's. declines STI Screening.       Surveillance of contraceptive pill  Stopped previous OCP due to missed pill and BTB, however then stayed off to see if wt loss would increase. Minimal changes  Consisdering restarting but wants to try something less likely to cause wt. Gain. Reviewed is very variable from person to person and pill to pill.   Will try lo-loestrin  Orders:  •  Norethin-Eth Estrad-Fe Biphas (Lo Loestrin Fe) 1 MG-10 MCG / 10 MCG TABS; Take 1 tablet by mouth daily    Late menses  UPT neg. Likely 2/2 to GI virus/stress suppressing menses.   Call if persiste  Orders:  •  POCT urine HCG    Vaginal odor  At end of visit c/o malodor.  Wet mount c/w BV- flagyl given and reviewed Preventive measures  Orders:  •  POCT wet mount  •  metroNIDAZOLE (FLAGYL) 500 mg tablet; Take 1 tablet (500 mg total) by mouth 2 (two) times a day for 7 days               History of Present Illness     Scarlet Mota is a 34 y.o. female.She is here for Gynecologic Exam (Pap 24 -/-/Birth control wants to talk options /Std testing declined/Gardasil not completed)      Was on OCP but missed a pill and had abnormal bldg.   And stopped in October.   Also  working w/ a dietician to lose weight and wanted to see if not being on contraception helped with that.  Did lose a few #'s  Inquiring. About other BC that might not cause wet gain.  This last period is late- got norovirus 1.5 wks ago.  UPT negative.   She denies any C/O abnormal vaginal discharge or irritation. At end of visit mentioned occasiional odor. Denies Urinary complaints or breast changes    Sexually active: yes, , Monogamous  Denies C/O related to intimacy/sexual activity  Contraception: condoms    She reports she feels safe at home.   Lifestyle/exercise: took a break since November, just getting back to it now  Calcium/vit D  intake: adequate.      HEALTH MAINTENANCE SCREENINGS:     Previous pap smears and ASCCP screening guidelines have been reviewed.   Last Pap:  01/12/2024; Next due 2029  History of abnormal pap: No      IMMUNIZATIONS  Gardasil HPV vaccine Was Not completed    Hereditary Cancer Screening  FH Breast/Ovarian cancer:no  FH Uterine cancer: no  FH Colon ca: no      Substance Abuse Screening Completed. See hx and flowsheet.    Review of Systems   Constitutional:  Negative for appetite change, chills, fatigue, fever and unexpected weight change.   HENT: Negative.     Eyes: Negative.    Respiratory:  Negative for chest tightness and shortness of breath.    Cardiovascular:  Negative for chest pain and palpitations.   Gastrointestinal:  Negative for abdominal pain, constipation and vomiting.   Endocrine: Negative for cold intolerance and heat intolerance.   Genitourinary:         As per HPI   Musculoskeletal:  Negative for back pain, joint swelling and neck pain.   Skin:  Negative for color change and rash.   Neurological:  Negative for dizziness, weakness and numbness.   Hematological:  Does not bruise/bleed easily.   Psychiatric/Behavioral: Negative.       The following portions of the patient's history were reviewed and updated as appropriate: allergies, current medications, past  "family history, past medical history, past social history, past surgical history, and problem list.      Objective:   Objective   /60 (BP Location: Left arm, Patient Position: Sitting, Cuff Size: Standard)   Ht 5' 1.75\" (1.568 m)   Wt 71.2 kg (157 lb)   LMP 12/16/2024 (Exact Date)   BMI 28.95 kg/m²     Physical Exam  Constitutional:       General: She is not in acute distress.     Appearance: Normal appearance.   Genitourinary:      Vulva and rectum normal.      No lesions in the vagina.      Right Labia: No rash or lesions.     Left Labia: No lesions or rash.     Vaginal discharge (moder d/c. Slightly heterogeneous) present.      No vaginal erythema, tenderness or bleeding.        Right Adnexa: not tender and no mass present.     Left Adnexa: not tender and no mass present.     No cervical motion tenderness, discharge or friability.      Uterus is not enlarged or tender.      No urethral prolapse present.      Pelvic exam was performed with patient in the lithotomy position.   Breasts:     Breasts are symmetrical.      Right: No inverted nipple, mass, nipple discharge, skin change or tenderness.      Left: No inverted nipple, mass, nipple discharge, skin change or tenderness.   HENT:      Head: Normocephalic and atraumatic.   Cardiovascular:      Rate and Rhythm: Normal rate.      Heart sounds: No murmur heard.  Pulmonary:      Effort: Pulmonary effort is normal.      Breath sounds: Normal breath sounds.   Abdominal:      General: There is no distension.      Palpations: Abdomen is soft.      Tenderness: There is no abdominal tenderness.   Musculoskeletal:         General: Normal range of motion.      Cervical back: Normal range of motion.   Lymphadenopathy:      Cervical: No cervical adenopathy.   Neurological:      Mental Status: She is alert and oriented to person, place, and time.   Skin:     General: Skin is warm and dry.   Psychiatric:         Mood and Affect: Mood normal.         Behavior: Behavior " normal.   Vitals reviewed.

## 2025-01-17 ENCOUNTER — ANNUAL EXAM (OUTPATIENT)
Dept: OBGYN CLINIC | Facility: MEDICAL CENTER | Age: 35
End: 2025-01-17
Payer: COMMERCIAL

## 2025-01-17 VITALS
SYSTOLIC BLOOD PRESSURE: 108 MMHG | DIASTOLIC BLOOD PRESSURE: 60 MMHG | BODY MASS INDEX: 28.89 KG/M2 | WEIGHT: 157 LBS | HEIGHT: 62 IN

## 2025-01-17 DIAGNOSIS — N89.8 VAGINAL ODOR: ICD-10-CM

## 2025-01-17 DIAGNOSIS — Z01.419 ENCOUNTER FOR GYNECOLOGICAL EXAMINATION (GENERAL) (ROUTINE) WITHOUT ABNORMAL FINDINGS: Primary | ICD-10-CM

## 2025-01-17 DIAGNOSIS — Z30.41 SURVEILLANCE OF CONTRACEPTIVE PILL: ICD-10-CM

## 2025-01-17 DIAGNOSIS — N92.6 LATE MENSES: ICD-10-CM

## 2025-01-17 LAB
BV WHIFF TEST VAG QL: ABNORMAL
CLUE CELLS SPEC QL WET PREP: ABNORMAL
PH SMN: 5 [PH]
SL AMB POCT URINE HCG: ABNORMAL
SL AMB POCT WET MOUNT: ABNORMAL
T VAGINALIS VAG QL WET PREP: ABNORMAL
YEAST VAG QL WET PREP: ABNORMAL

## 2025-01-17 PROCEDURE — 81025 URINE PREGNANCY TEST: CPT | Performed by: CLINICAL NURSE SPECIALIST

## 2025-01-17 PROCEDURE — 87210 SMEAR WET MOUNT SALINE/INK: CPT | Performed by: CLINICAL NURSE SPECIALIST

## 2025-01-17 PROCEDURE — 99395 PREV VISIT EST AGE 18-39: CPT | Performed by: CLINICAL NURSE SPECIALIST

## 2025-01-17 RX ORDER — METRONIDAZOLE 500 MG/1
500 TABLET ORAL 2 TIMES DAILY
Qty: 14 TABLET | Refills: 0 | Status: SHIPPED | OUTPATIENT
Start: 2025-01-17 | End: 2025-01-24

## 2025-01-17 RX ORDER — NORETHINDRONE ACETATE AND ETHINYL ESTRADIOL, ETHINYL ESTRADIOL AND FERROUS FUMARATE 1MG-10(24)
1 KIT ORAL DAILY
Qty: 90 TABLET | Refills: 4 | Status: SHIPPED | OUTPATIENT
Start: 2025-01-17 | End: 2026-01-17